# Patient Record
Sex: MALE | Race: WHITE | NOT HISPANIC OR LATINO | URBAN - METROPOLITAN AREA
[De-identification: names, ages, dates, MRNs, and addresses within clinical notes are randomized per-mention and may not be internally consistent; named-entity substitution may affect disease eponyms.]

---

## 2019-10-24 ENCOUNTER — OUTPATIENT (OUTPATIENT)
Dept: OUTPATIENT SERVICES | Facility: HOSPITAL | Age: 69
LOS: 1 days | End: 2019-10-24
Payer: MEDICARE

## 2019-10-24 LAB — GLUCOSE BLDC GLUCOMTR-MCNC: 94 MG/DL — SIGNIFICANT CHANGE UP (ref 70–99)

## 2019-10-24 PROCEDURE — 78072 PARATHYRD PLANAR W/SPECT&CT: CPT | Mod: 26

## 2019-10-24 PROCEDURE — 82962 GLUCOSE BLOOD TEST: CPT

## 2019-10-24 PROCEDURE — A9500: CPT

## 2019-10-24 PROCEDURE — 78072 PARATHYRD PLANAR W/SPECT&CT: CPT

## 2019-10-24 PROCEDURE — A9512: CPT

## 2019-11-18 ENCOUNTER — APPOINTMENT (OUTPATIENT)
Dept: OTOLARYNGOLOGY | Facility: CLINIC | Age: 69
End: 2019-11-18
Payer: MEDICARE

## 2019-11-18 PROCEDURE — 99204 OFFICE O/P NEW MOD 45 MIN: CPT | Mod: 25

## 2019-11-18 PROCEDURE — 31575 DIAGNOSTIC LARYNGOSCOPY: CPT

## 2019-12-19 ENCOUNTER — APPOINTMENT (OUTPATIENT)
Dept: OTOLARYNGOLOGY | Facility: HOSPITAL | Age: 69
End: 2019-12-19

## 2019-12-19 VITALS
HEIGHT: 70 IN | TEMPERATURE: 98 F | OXYGEN SATURATION: 98 % | DIASTOLIC BLOOD PRESSURE: 76 MMHG | HEART RATE: 51 BPM | SYSTOLIC BLOOD PRESSURE: 119 MMHG | WEIGHT: 174.39 LBS | RESPIRATION RATE: 16 BRPM

## 2019-12-20 ENCOUNTER — RESULT REVIEW (OUTPATIENT)
Age: 69
End: 2019-12-20

## 2019-12-20 ENCOUNTER — INPATIENT (INPATIENT)
Facility: HOSPITAL | Age: 69
LOS: 0 days | Discharge: HOME | DRG: 627 | End: 2019-12-21
Attending: SPECIALIST | Admitting: SPECIALIST
Payer: COMMERCIAL

## 2019-12-20 ENCOUNTER — APPOINTMENT (OUTPATIENT)
Dept: OTOLARYNGOLOGY | Facility: HOSPITAL | Age: 69
End: 2019-12-20

## 2019-12-20 DIAGNOSIS — Z90.89 ACQUIRED ABSENCE OF OTHER ORGANS: Chronic | ICD-10-CM

## 2019-12-20 DIAGNOSIS — Z90.49 ACQUIRED ABSENCE OF OTHER SPECIFIED PARTS OF DIGESTIVE TRACT: Chronic | ICD-10-CM

## 2019-12-20 LAB
CALCIUM SERPL-MCNC: 9.8 MG/DL — SIGNIFICANT CHANGE UP (ref 8.4–10.5)
PTH-INTACT IO % DIF SERPL: 154.1 PG/ML — HIGH (ref 8.5–72.5)
PTH-INTACT IO % DIF SERPL: 40.6 PG/ML — SIGNIFICANT CHANGE UP (ref 8.5–72.5)
PTH-INTACT IO % DIF SERPL: 65.7 PG/ML — SIGNIFICANT CHANGE UP (ref 8.5–72.5)

## 2019-12-20 PROCEDURE — 88305 TISSUE EXAM BY PATHOLOGIST: CPT | Mod: 26

## 2019-12-20 PROCEDURE — 60500 EXPLORE PARATHYROID GLANDS: CPT

## 2019-12-20 PROCEDURE — 88331 PATH CONSLTJ SURG 1 BLK 1SPC: CPT | Mod: 26

## 2019-12-20 RX ORDER — ATENOLOL 25 MG/1
1 TABLET ORAL
Qty: 0 | Refills: 0 | DISCHARGE

## 2019-12-20 RX ORDER — ATORVASTATIN CALCIUM 80 MG/1
20 TABLET, FILM COATED ORAL AT BEDTIME
Refills: 0 | Status: DISCONTINUED | OUTPATIENT
Start: 2019-12-20 | End: 2019-12-21

## 2019-12-20 RX ORDER — MORPHINE SULFATE 50 MG/1
2 CAPSULE, EXTENDED RELEASE ORAL ONCE
Refills: 0 | Status: DISCONTINUED | OUTPATIENT
Start: 2019-12-20 | End: 2019-12-21

## 2019-12-20 RX ORDER — OXYCODONE AND ACETAMINOPHEN 5; 325 MG/1; MG/1
2 TABLET ORAL EVERY 4 HOURS
Refills: 0 | Status: DISCONTINUED | OUTPATIENT
Start: 2019-12-20 | End: 2019-12-21

## 2019-12-20 RX ORDER — ACETAMINOPHEN 500 MG
650 TABLET ORAL EVERY 6 HOURS
Refills: 0 | Status: DISCONTINUED | OUTPATIENT
Start: 2019-12-20 | End: 2019-12-21

## 2019-12-20 RX ORDER — ATENOLOL 25 MG/1
25 TABLET ORAL DAILY
Refills: 0 | Status: DISCONTINUED | OUTPATIENT
Start: 2019-12-20 | End: 2019-12-21

## 2019-12-20 RX ORDER — SODIUM CHLORIDE 9 MG/ML
500 INJECTION, SOLUTION INTRAVENOUS
Refills: 0 | Status: DISCONTINUED | OUTPATIENT
Start: 2019-12-20 | End: 2019-12-21

## 2019-12-20 RX ORDER — OMEPRAZOLE 10 MG/1
1 CAPSULE, DELAYED RELEASE ORAL
Qty: 0 | Refills: 0 | DISCHARGE

## 2019-12-20 RX ORDER — ATORVASTATIN CALCIUM 80 MG/1
1 TABLET, FILM COATED ORAL
Qty: 0 | Refills: 0 | DISCHARGE

## 2019-12-20 RX ORDER — BUPROPION HYDROCHLORIDE 150 MG/1
1 TABLET, EXTENDED RELEASE ORAL
Qty: 0 | Refills: 0 | DISCHARGE

## 2019-12-20 RX ORDER — ONDANSETRON 8 MG/1
4 TABLET, FILM COATED ORAL ONCE
Refills: 0 | Status: DISCONTINUED | OUTPATIENT
Start: 2019-12-20 | End: 2019-12-21

## 2019-12-20 RX ORDER — ALENDRONATE SODIUM 70 MG/1
1 TABLET ORAL
Qty: 0 | Refills: 0 | DISCHARGE

## 2019-12-20 RX ORDER — BUPROPION HYDROCHLORIDE 150 MG/1
150 TABLET, EXTENDED RELEASE ORAL DAILY
Refills: 0 | Status: DISCONTINUED | OUTPATIENT
Start: 2019-12-20 | End: 2019-12-21

## 2019-12-20 RX ORDER — OXYCODONE AND ACETAMINOPHEN 5; 325 MG/1; MG/1
1 TABLET ORAL EVERY 4 HOURS
Refills: 0 | Status: DISCONTINUED | OUTPATIENT
Start: 2019-12-20 | End: 2019-12-21

## 2019-12-20 RX ORDER — PANTOPRAZOLE SODIUM 20 MG/1
40 TABLET, DELAYED RELEASE ORAL
Refills: 0 | Status: DISCONTINUED | OUTPATIENT
Start: 2019-12-20 | End: 2019-12-21

## 2019-12-20 RX ORDER — CEFAZOLIN SODIUM 1 G
1000 VIAL (EA) INJECTION EVERY 8 HOURS
Refills: 0 | Status: COMPLETED | OUTPATIENT
Start: 2019-12-20 | End: 2019-12-21

## 2019-12-20 RX ORDER — LORATADINE 10 MG/1
10 TABLET ORAL DAILY
Refills: 0 | Status: DISCONTINUED | OUTPATIENT
Start: 2019-12-20 | End: 2019-12-21

## 2019-12-20 RX ADMIN — Medication 100 MILLIGRAM(S): at 15:39

## 2019-12-20 RX ADMIN — Medication 100 MILLIGRAM(S): at 22:26

## 2019-12-20 RX ADMIN — ATORVASTATIN CALCIUM 20 MILLIGRAM(S): 80 TABLET, FILM COATED ORAL at 22:26

## 2019-12-20 NOTE — H&P ADULT - HISTORY OF PRESENT ILLNESS
ENT H&P     HPI: 70yo Male w hx of hyperparathyroidism s/p L parathyroidectomy. Intraop PTH with >50% drop. Patient extubated in OR, transferred to PACU in stable condition. Patient tolerating diet, pain well controlled. Denies numbness or tingling of fingers and mouth.      Allergies    sulfa drugs (Unknown)    Intolerances        PAST MEDICAL & SURGICAL HISTORY:  HLD (hyperlipidemia)  GERD (gastroesophageal reflux disease)  BPH (benign prostatic hyperplasia)  HTN (hypertension)  Hyperparathyroidism  History of tonsillectomy  History of cholecystectomy      REVIEW OF SYSTEMS as above     Home Medications:  alendronate 70 mg oral tablet: 1 tab(s) orally once a week (20 Dec 2019 06:27)  atenolol 25 mg oral tablet: 1 tab(s) orally once a day (20 Dec 2019 06:27)  buPROPion 150 mg/12 hours (SR) oral tablet, extended release: 1 tab(s) orally once a day (at bedtime) (20 Dec 2019 06:27)  Lipitor 20 mg oral tablet: 1 tab(s) orally once a day (20 Dec 2019 06:27)  PriLOSEC 20 mg oral delayed release capsule: 1 cap(s) orally once a day (20 Dec 2019 06:27)      Vital Signs Last 24 Hrs  T(C): 36.8 (19 Dec 2019 12:49), Max: 36.8 (19 Dec 2019 12:49)  T(F): 98.3 (19 Dec 2019 12:49), Max: 98.3 (19 Dec 2019 12:49)  HR: 51 (19 Dec 2019 12:49) (51 - 51)  BP: 119/76 (19 Dec 2019 12:49) (119/76 - 119/76)  BP(mean): --  RR: 16 (19 Dec 2019 12:49) (16 - 16)  SpO2: 98% (19 Dec 2019 12:49) (98% - 98%)    LABS:    Endocrine Panel-  --  40.6 pg/mL  --  --  65.7 pg/mL  --  --  154.1 pg/mL  --      PHYSICAL EXAM:  NAD, awake and alert  No hoarseness.     No resp distress on room air   Midline neck incision c/d/i, soft  L neck BRANDON w SS output   SILVESTRE, SAEIDP     Assessment/Plan:  69y Male w hx of hyperparathyroidism s/p L parathyroidectomy on 12/20  - Admit to regional room  - Ancef   - BRANDON drain care  - Pain control  - Nausea PRN   - Continue home meds   - Regular diet   - OOB/ambulate     Page E[facility] at [phone] with any questions/concerns.

## 2019-12-21 ENCOUNTER — TRANSCRIPTION ENCOUNTER (OUTPATIENT)
Age: 69
End: 2019-12-21

## 2019-12-21 VITALS
SYSTOLIC BLOOD PRESSURE: 137 MMHG | HEART RATE: 61 BPM | OXYGEN SATURATION: 97 % | DIASTOLIC BLOOD PRESSURE: 81 MMHG | TEMPERATURE: 98 F | RESPIRATION RATE: 17 BRPM

## 2019-12-21 LAB
CALCIUM SERPL-MCNC: 9.1 MG/DL — SIGNIFICANT CHANGE UP (ref 8.4–10.5)
CALCIUM SERPL-MCNC: 9.4 MG/DL — SIGNIFICANT CHANGE UP (ref 8.4–10.5)
HCV AB S/CO SERPL IA: 0.33 S/CO — SIGNIFICANT CHANGE UP
HCV AB SERPL-IMP: SIGNIFICANT CHANGE UP

## 2019-12-21 RX ADMIN — Medication 100 MILLIGRAM(S): at 05:52

## 2019-12-21 RX ADMIN — SODIUM CHLORIDE 75 MILLILITER(S): 9 INJECTION, SOLUTION INTRAVENOUS at 05:51

## 2019-12-21 RX ADMIN — PANTOPRAZOLE SODIUM 40 MILLIGRAM(S): 20 TABLET, DELAYED RELEASE ORAL at 02:09

## 2019-12-21 RX ADMIN — ATENOLOL 25 MILLIGRAM(S): 25 TABLET ORAL at 05:52

## 2019-12-21 NOTE — DISCHARGE NOTE PROVIDER - CARE PROVIDER_API CALL
Cheryl Aquino)  [facility] Surgery; Otolaryngology  [address] [place] [address], [address] [place] [address]  Phone: [phone]  Fax: [phone]  Scheduled Appointment: 12/30/2019

## 2019-12-21 NOTE — DISCHARGE NOTE PROVIDER - NSDCMRMEDTOKEN_GEN_ALL_CORE_FT
alendronate 70 mg oral tablet: 1 tab(s) orally once a week  atenolol 25 mg oral tablet: 1 tab(s) orally once a day  buPROPion 150 mg/12 hours (SR) oral tablet, extended release: 1 tab(s) orally once a day (at bedtime)  Lipitor 20 mg oral tablet: 1 tab(s) orally once a day  Percocet 5/325 oral tablet: 1 tab(s) orally every 4 hours MDD:6 tabs  PriLOSEC 20 mg oral delayed release capsule: 1 cap(s) orally once a day

## 2019-12-21 NOTE — DISCHARGE NOTE NURSING/CASE MANAGEMENT/SOCIAL WORK - PATIENT PORTAL LINK FT
You can access the FollowMyHealth Patient Portal offered by [facility] by registering at the following website: [email or web address]. By joining [employer]’s FollowMyHealth portal, you will also be able to view your health information using other applications (apps) compatible with our system.

## 2019-12-21 NOTE — DISCHARGE NOTE PROVIDER - NSDCCPCAREPLAN_GEN_ALL_CORE_FT
PRINCIPAL DISCHARGE DIAGNOSIS  Diagnosis: S/P parathyroidectomy  Assessment and Plan of Treatment: 1.Report any fever, chills, difficulty breathing, difficulty swallowing, and change in your voice, bleeding or purulent drainage from your incision sites, or any significant swelling to your neck to the doctor immediately.  2.Report any numbness/tingling to toes or lips to your doctor immediately. If you have numbness or tingling in your fingers/lips take 2 tums or 2 tablets of calcium supplementation and call your doctor immediately.   3.Diet: soft/mechanical soft diet, no sharp foods, no extensive chewing of steaks or hard meats etc. You can resume a normal diet once your throat feels back to normal.  4.Activity: no heavy lifting, do not lift anything heavier than a gallon of milk until after you follow up appointment with your doctor, no strenuous activity such as running or biking.  5.Shower/Bathing: you shower starting 48 hrs after you procedure, after 48 hrs you may wash your hair and shower but do not scrub at your neck incision  6.Wound care: keep your incision site clean and dry   7.Take all medications as prescribed.   8.Continue all of your normal home medications unless told otherwise.  9.Avoid any NSAIDS or ibuprofen-containing products; you may take Tylenol for mild pain.

## 2019-12-21 NOTE — DISCHARGE NOTE PROVIDER - HOSPITAL COURSE
70yo Male w hx of hyperparathyroidism s/p L parathyroidectomy. Intraop PTH with >50% drop. Patient extubated in OR, transferred to PACU in stable condition. Patient tolerating diet, pain well controlled. Denies numbness or tingling of fingers and mouth.  Overnight calcium checks were 9.8 and 9.4. BRANDON output monitored with appropriate decrease for removal in AM. By the time of discharge on POD1, the patient was tolerating diet, voiding, and ambulating and was deemed stable for discharge.        PHYSICAL EXAM:    NAD, awake and alert  No hoarseness.       No resp distress on room air     Midline neck incision c/d/i, soft    SILVESTRE, WWP

## 2019-12-23 PROBLEM — K21.9 GASTRO-ESOPHAGEAL REFLUX DISEASE WITHOUT ESOPHAGITIS: Chronic | Status: ACTIVE | Noted: 2019-12-19

## 2019-12-23 PROBLEM — E21.3 HYPERPARATHYROIDISM, UNSPECIFIED: Chronic | Status: ACTIVE | Noted: 2019-12-19

## 2019-12-23 PROBLEM — E78.5 HYPERLIPIDEMIA, UNSPECIFIED: Chronic | Status: ACTIVE | Noted: 2019-12-19

## 2019-12-23 PROBLEM — N40.0 BENIGN PROSTATIC HYPERPLASIA WITHOUT LOWER URINARY TRACT SYMPTOMS: Chronic | Status: ACTIVE | Noted: 2019-12-19

## 2019-12-23 PROBLEM — I10 ESSENTIAL (PRIMARY) HYPERTENSION: Chronic | Status: ACTIVE | Noted: 2019-12-19

## 2019-12-24 LAB — SURGICAL PATHOLOGY STUDY: SIGNIFICANT CHANGE UP

## 2019-12-30 ENCOUNTER — LABORATORY RESULT (OUTPATIENT)
Age: 69
End: 2019-12-30

## 2019-12-30 ENCOUNTER — APPOINTMENT (OUTPATIENT)
Dept: OTOLARYNGOLOGY | Facility: CLINIC | Age: 69
End: 2019-12-30
Payer: MEDICARE

## 2019-12-30 PROCEDURE — 31575 DIAGNOSTIC LARYNGOSCOPY: CPT | Mod: 58

## 2019-12-31 DIAGNOSIS — F32.9 MAJOR DEPRESSIVE DISORDER, SINGLE EPISODE, UNSPECIFIED: ICD-10-CM

## 2019-12-31 DIAGNOSIS — E21.0 PRIMARY HYPERPARATHYROIDISM: ICD-10-CM

## 2019-12-31 DIAGNOSIS — K21.9 GASTRO-ESOPHAGEAL REFLUX DISEASE WITHOUT ESOPHAGITIS: ICD-10-CM

## 2019-12-31 DIAGNOSIS — D35.1 BENIGN NEOPLASM OF PARATHYROID GLAND: ICD-10-CM

## 2019-12-31 DIAGNOSIS — I10 ESSENTIAL (PRIMARY) HYPERTENSION: ICD-10-CM

## 2019-12-31 DIAGNOSIS — M81.0 AGE-RELATED OSTEOPOROSIS WITHOUT CURRENT PATHOLOGICAL FRACTURE: ICD-10-CM

## 2019-12-31 PROCEDURE — 86803 HEPATITIS C AB TEST: CPT

## 2019-12-31 PROCEDURE — 88305 TISSUE EXAM BY PATHOLOGIST: CPT

## 2019-12-31 PROCEDURE — 60500 EXPLORE PARATHYROID GLANDS: CPT

## 2019-12-31 PROCEDURE — 82310 ASSAY OF CALCIUM: CPT

## 2019-12-31 PROCEDURE — 88331 PATH CONSLTJ SURG 1 BLK 1SPC: CPT

## 2019-12-31 PROCEDURE — 36415 COLL VENOUS BLD VENIPUNCTURE: CPT

## 2019-12-31 PROCEDURE — 83970 ASSAY OF PARATHORMONE: CPT

## 2020-05-18 ENCOUNTER — APPOINTMENT (OUTPATIENT)
Dept: OTOLARYNGOLOGY | Facility: CLINIC | Age: 70
End: 2020-05-18
Payer: MEDICARE

## 2020-05-18 PROCEDURE — 99213 OFFICE O/P EST LOW 20 MIN: CPT | Mod: 95

## 2020-06-03 ENCOUNTER — TRANSCRIPTION ENCOUNTER (OUTPATIENT)
Age: 70
End: 2020-06-03

## 2020-07-07 ENCOUNTER — APPOINTMENT (OUTPATIENT)
Dept: UROLOGY | Facility: CLINIC | Age: 70
End: 2020-07-07
Payer: MEDICARE

## 2020-07-07 VITALS — DIASTOLIC BLOOD PRESSURE: 73 MMHG | TEMPERATURE: 97.6 F | HEART RATE: 52 BPM | SYSTOLIC BLOOD PRESSURE: 110 MMHG

## 2020-07-07 LAB
BILIRUB UR QL STRIP: NORMAL
CLARITY UR: CLEAR
COLLECTION METHOD: NORMAL
GLUCOSE UR-MCNC: NORMAL
HCG UR QL: 0.2 EU/DL
HGB UR QL STRIP.AUTO: NORMAL
KETONES UR-MCNC: NORMAL
LEUKOCYTE ESTERASE UR QL STRIP: NORMAL
NITRITE UR QL STRIP: NORMAL
PH UR STRIP: 6.5
PROT UR STRIP-MCNC: NORMAL
SP GR UR STRIP: 1.01

## 2020-07-07 PROCEDURE — 84153 ASSAY OF PSA TOTAL: CPT

## 2020-07-07 PROCEDURE — 99215 OFFICE O/P EST HI 40 MIN: CPT | Mod: 25

## 2020-07-07 PROCEDURE — 76857 US EXAM PELVIC LIMITED: CPT

## 2020-07-07 PROCEDURE — 81003 URINALYSIS AUTO W/O SCOPE: CPT | Mod: QW

## 2020-07-07 NOTE — ASSESSMENT
[FreeTextEntry1] : 70 year old male with family history of prostate cancer and elevated PSA. 4K sent today-will be in touch with patient regarding the results.

## 2020-07-07 NOTE — LETTER BODY
[Dear  ___] : Dear  [unfilled], [Consult Letter:] : I had the pleasure of evaluating your patient, [unfilled]. [Please see my note below.] : Please see my note below. [Sincerely,] : Sincerely, [Consult Closing:] : Thank you very much for allowing me to participate in the care of this patient.  If you have any questions, please do not hesitate to contact me. [FreeTextEntry3] : Dr. Fe Cardenas\par

## 2020-07-07 NOTE — HISTORY OF PRESENT ILLNESS
[FreeTextEntry1] : Pt is a former patient of Dr. Nelson's who has transitioned care to me.   He has a family history of prostate cancer and an elevated PSA.  His most recent PSA drawn at his PCP's office was 7.6.  \par In the past he underwent an MRI guided prostate biopsy at [facility] which was inconclusive, (6/2019)\par \par He has 1-2 episodes of nocturia per night.  He states he "occasionally" will have to void several times an hour but this is not a regular pattern. \par \par PSA history:  \par 6/20 7.6  \par 3/19 8.5   4K 33%  (4/2019)\par 6/18 7.1\par 2/18 8.2\par \par PMH:  hyperparathyroid, HTN, hyperchol\par PSH:  devi, cataract, parathyroidectomy \par FamHx: prostate cancer (brother)\par No tob, occasional alcohol\par \par

## 2020-07-07 NOTE — PHYSICAL EXAM
[General Appearance - Well Developed] : well developed [General Appearance - Well Nourished] : well nourished [Well Groomed] : well groomed [Normal Appearance] : normal appearance [General Appearance - In No Acute Distress] : no acute distress [Abdomen Soft] : soft [Abdomen Tenderness] : non-tender [Costovertebral Angle Tenderness] : no ~M costovertebral angle tenderness [Urethral Meatus] : meatus normal [Scrotum] : the scrotum was normal [Penis Abnormality] : normal circumcised penis [Urinary Bladder Findings] : the bladder was normal on palpation [Testes Mass (___cm)] : there were no testicular masses [No Prostate Nodules] : no prostate nodules [FreeTextEntry1] : left gland firm, slight asymmetry [] : no respiratory distress [Edema] : no peripheral edema [Respiration, Rhythm And Depth] : normal respiratory rhythm and effort [Oriented To Time, Place, And Person] : oriented to person, place, and time [Exaggerated Use Of Accessory Muscles For Inspiration] : no accessory muscle use [Affect] : the affect was normal [Not Anxious] : not anxious [Mood] : the mood was normal [No Palpable Adenopathy] : no palpable adenopathy [No Focal Deficits] : no focal deficits [Normal Station and Gait] : the gait and station were normal for the patient's age

## 2020-07-17 LAB
4K SCORE CALCULATION: 6 %
FREE PSA: 1.83 NG/ML
PERCENT FREE PSA: 18 %
TOTAL PSA: 10.44 NG/ML

## 2020-07-29 LAB — PSA SERPL-MCNC: 8.18 NG/ML

## 2021-04-29 ENCOUNTER — NON-APPOINTMENT (OUTPATIENT)
Age: 71
End: 2021-04-29

## 2021-05-19 ENCOUNTER — APPOINTMENT (OUTPATIENT)
Dept: UROLOGY | Facility: CLINIC | Age: 71
End: 2021-05-19
Payer: MEDICARE

## 2021-05-19 VITALS
HEART RATE: 60 BPM | SYSTOLIC BLOOD PRESSURE: 113 MMHG | BODY MASS INDEX: 24.91 KG/M2 | HEIGHT: 70 IN | OXYGEN SATURATION: 97 % | TEMPERATURE: 98.2 F | WEIGHT: 174 LBS | DIASTOLIC BLOOD PRESSURE: 69 MMHG

## 2021-05-19 PROCEDURE — 99214 OFFICE O/P EST MOD 30 MIN: CPT

## 2021-05-20 LAB
PSA FREE FLD-MCNC: 18 %
PSA FREE SERPL-MCNC: 1.54 NG/ML
PSA SERPL-MCNC: 8.68 NG/ML

## 2021-05-20 NOTE — ASSESSMENT
[FreeTextEntry1] : 70 year old male with family history of prostate cancer and elevated PSA. Will draw PSA today and I will be in touch with the results. Because he has a family history of CaP as well as a hx of elevated PSA with prior inconclusive biopsy results, I will consider sending him to one of my partners for further evaluation/possible fusion biopsy.   \par \par I believe his morning lower urinary tract symptoms correlate to bladder irritant/caffeine intake and I have advised him to decrease amount of coffee.  Will address bladder urgency if it worsen or he becomes greatly bothered. \par \par Patient expressed understanding.

## 2021-05-20 NOTE — LETTER BODY
[Dear  ___] : Dear  [unfilled], [Consult Letter:] : I had the pleasure of evaluating your patient, [unfilled]. [Please see my note below.] : Please see my note below. [Consult Closing:] : Thank you very much for allowing me to participate in the care of this patient.  If you have any questions, please do not hesitate to contact me. [Sincerely,] : Sincerely, [FreeTextEntry3] : Dr. Fe Cardenas\par

## 2021-05-20 NOTE — PHYSICAL EXAM
[General Appearance - Well Developed] : well developed [General Appearance - Well Nourished] : well nourished [Normal Appearance] : normal appearance [Well Groomed] : well groomed [General Appearance - In No Acute Distress] : no acute distress [Urethral Meatus] : meatus normal [Penis Abnormality] : normal circumcised penis [Urinary Bladder Findings] : the bladder was normal on palpation [Scrotum] : the scrotum was normal [Testes Mass (___cm)] : there were no testicular masses [No Prostate Nodules] : no prostate nodules [Edema] : no peripheral edema [] : no respiratory distress [Exaggerated Use Of Accessory Muscles For Inspiration] : no accessory muscle use [Oriented To Time, Place, And Person] : oriented to person, place, and time [Affect] : the affect was normal [Mood] : the mood was normal [Not Anxious] : not anxious [Normal Station and Gait] : the gait and station were normal for the patient's age [No Focal Deficits] : no focal deficits [Prostate Tenderness] : the prostate was not tender [FreeTextEntry1] : nontender, nonnodular prostate

## 2021-05-20 NOTE — HISTORY OF PRESENT ILLNESS
[FreeTextEntry1] : 70 year old male with family history of prostate cancer and elevated PSA presenting for annual wellness exam. He reports incomplete bladder empty sensation in the morning with strong stream. He states this sensation occurs s/p coffee. Continues to report nocturia 1-2x per night. \par \par Full Hx: \par Pt is a former patient of Dr. Nelson's who has transitioned care to me.   He has a family history of prostate cancer and an elevated PSA.  He last presented to me 7/7/2020. His most recent PSA drawn at his PCP's office was 7.6.  In the past he underwent an MRI guided prostate biopsy at [facility] which was inconclusive, (6/2019)\par \par He had 1-2 episodes of nocturia per night.  He stated he "occasionally" will have to void several times an hour but this was not a regular pattern. 4K sent. \par \par Udip: negative\par \par PSA history: \par 7/20 8.18 4K 6%\par 6/20 7.6  \par 3/19 8.5   4K 33%  (4/2019)\par 6/18 7.1\par 2/18 8.2\par \par PMH:  hyperparathyroid, HTN, hyperchol\par PSH:  devi, cataract, parathyroidectomy \par FamHx: prostate cancer (brother)\par No tob, occasional alcohol\par \par

## 2021-06-25 ENCOUNTER — APPOINTMENT (OUTPATIENT)
Dept: UROLOGY | Facility: CLINIC | Age: 71
End: 2021-06-25
Payer: MEDICARE

## 2021-06-25 DIAGNOSIS — Z86.39 PERSONAL HISTORY OF OTHER ENDOCRINE, NUTRITIONAL AND METABOLIC DISEASE: ICD-10-CM

## 2021-06-25 DIAGNOSIS — I10 ESSENTIAL (PRIMARY) HYPERTENSION: ICD-10-CM

## 2021-06-25 DIAGNOSIS — Z80.42 FAMILY HISTORY OF MALIGNANT NEOPLASM OF PROSTATE: ICD-10-CM

## 2021-06-25 DIAGNOSIS — Z87.2 PERSONAL HISTORY OF DISEASES OF THE SKIN AND SUBCUTANEOUS TISSUE: ICD-10-CM

## 2021-06-25 PROCEDURE — 99214 OFFICE O/P EST MOD 30 MIN: CPT

## 2021-06-25 NOTE — PHYSICAL EXAM
[General Appearance - Well Developed] : well developed [Normal Appearance] : normal appearance [Abdomen Soft] : soft [Urethral Meatus] : meatus normal [Penis Abnormality] : normal circumcised penis [Epididymis] : the epididymides were normal [Testes Tenderness] : no tenderness of the testes [Testes Mass (___cm)] : there were no testicular masses [Prostate Tenderness] : the prostate was not tender [No Prostate Nodules] : no prostate nodules [Prostate Size ___ (0-4)] : prostate size [unfilled] (scale: 0-4) [FreeTextEntry1] : Right lobe of prostate more prominent than the left.  [Skin Color & Pigmentation] : normal skin color and pigmentation [Heart Rate And Rhythm] : Heart rate and rhythm were normal [] : no respiratory distress [Oriented To Time, Place, And Person] : oriented to person, place, and time [Normal Station and Gait] : the gait and station were normal for the patient's age [No Focal Deficits] : no focal deficits

## 2021-06-25 NOTE — HISTORY OF PRESENT ILLNESS
[FreeTextEntry1] : 72 yo male referred for hx of elevated PSA.  HIs most recent PSA was from May 2021 and was 8.6.  In July 2020 the PSA was 8.18.  A 4K score from July 202 was 6% and the PSA was 10.4.  Prostate MR in 2019 showed a PIRADs 4 lesion in the left posterior PZ.  He underwent two separate in-gantry MR biopsies. The prostate was measured at 85 cc in volume.  Both biopsies did not sample prostatic tissue. \par \par On a separate note, he complains of LUTS with urinary frequency, urgency, a weak stream, and nocturia x 1.  He denies dysuria, hematuria, incontinence, or hesitancy.  (IPSS from July 2020 was 5, and QoL was 2)

## 2021-06-25 NOTE — ASSESSMENT
[FreeTextEntry1] : 70 yo male with hx of elevated PSA, low risk 4k Score and two prior in-gantry MR prostate biopsies that did no sample prstate tissue. \par \par The patient's records were reviewed and discussed. The differential diagnosis of an elevated PSA (prostate specific antigen) level was discussed including prostate enlargement, prostate inflammation or infection, and prostate cancer. Options were provided for further evaluation including, but not limited to, serial PSA and digital rectal exam, PCA3, prostate MRI, and prostate biopsy. Newer tests including PHI (prostate health index) and 4Kscore tests were discussed. The merits and limitations as well as adverse effects associated with each option was provided. \par \par He has not had an MR since 2019.  I suggested we repeat the MR.  If a PIRADs 4 lesion is again noted then we will proceed with MR-US fusion transperineal biopsy.  \par \par We also discussed his LUTS secondary to BPH.\par \par Lower urinary symptoms were reviewed including the potential etiologies of the patient's symptoms. The anatomy of the urinary tract was reviewed. Bladder, prostate, and urethral sources, as well as non-urologic sources, were discussed. Options for evaluation were discussed including cystoscopy, urodynamics, and pelvis ultrasonography. Management of symptoms were reviewed including no therapy, medical therapy, and surgical therapy. The long term sequelae of no treatment, including no adverse effects, potential for progressive lower urinary tract symptoms or deterioration, urinary retention, bladder dysfunction, and renal dysfunction were reviewed. \par Medical therapy for BPH (benign prostatic hyperplasia), or prostate enlargement, was reviewed including the physiology of medication therapy. Alpha blocker medication therapy was reviewed including adverse effects (including dizziness, hypotension, retrograde ejaculation, rhinitis, fatigue), proper usage, and contraindications. \par \par He would like to try tamsulosin 0.4 mg nightly.  \par

## 2021-06-28 ENCOUNTER — NON-APPOINTMENT (OUTPATIENT)
Age: 71
End: 2021-06-28

## 2021-06-28 LAB
APPEARANCE: CLEAR
BACTERIA UR CULT: NORMAL
BACTERIA: NEGATIVE
BILIRUBIN URINE: NEGATIVE
BLOOD URINE: NEGATIVE
COLOR: NORMAL
GLUCOSE QUALITATIVE U: NEGATIVE
HYALINE CASTS: 0 /LPF
KETONES URINE: NEGATIVE
LEUKOCYTE ESTERASE URINE: NEGATIVE
MICROSCOPIC-UA: NORMAL
NITRITE URINE: NEGATIVE
PH URINE: 6.5
PROTEIN URINE: NEGATIVE
RED BLOOD CELLS URINE: 1 /HPF
SPECIFIC GRAVITY URINE: 1.01
SQUAMOUS EPITHELIAL CELLS: 0 /HPF
UROBILINOGEN URINE: NORMAL
WHITE BLOOD CELLS URINE: 0 /HPF

## 2021-08-13 ENCOUNTER — NON-APPOINTMENT (OUTPATIENT)
Age: 71
End: 2021-08-13

## 2021-08-16 ENCOUNTER — NON-APPOINTMENT (OUTPATIENT)
Age: 71
End: 2021-08-16

## 2021-08-17 ENCOUNTER — TRANSCRIPTION ENCOUNTER (OUTPATIENT)
Age: 71
End: 2021-08-17

## 2021-08-18 ENCOUNTER — APPOINTMENT (OUTPATIENT)
Dept: PULMONOLOGY | Facility: CLINIC | Age: 71
End: 2021-08-18

## 2021-09-10 ENCOUNTER — TRANSCRIPTION ENCOUNTER (OUTPATIENT)
Age: 71
End: 2021-09-10

## 2021-12-14 ENCOUNTER — APPOINTMENT (OUTPATIENT)
Dept: UROLOGY | Facility: CLINIC | Age: 71
End: 2021-12-14
Payer: MEDICARE

## 2021-12-14 VITALS
DIASTOLIC BLOOD PRESSURE: 87 MMHG | SYSTOLIC BLOOD PRESSURE: 155 MMHG | TEMPERATURE: 98.1 F | OXYGEN SATURATION: 96 % | HEART RATE: 54 BPM

## 2021-12-14 LAB
BILIRUB UR QL STRIP: NORMAL
CLARITY UR: CLEAR
COLLECTION METHOD: NORMAL
GLUCOSE UR-MCNC: NORMAL
HCG UR QL: 0.2 EU/DL
HGB UR QL STRIP.AUTO: NORMAL
KETONES UR-MCNC: NORMAL
LEUKOCYTE ESTERASE UR QL STRIP: NORMAL
NITRITE UR QL STRIP: NORMAL
PH UR STRIP: 7
PROT UR STRIP-MCNC: NORMAL
SP GR UR STRIP: 1.01

## 2021-12-14 PROCEDURE — 81003 URINALYSIS AUTO W/O SCOPE: CPT | Mod: QW

## 2021-12-14 PROCEDURE — 51798 US URINE CAPACITY MEASURE: CPT

## 2021-12-14 PROCEDURE — 99214 OFFICE O/P EST MOD 30 MIN: CPT

## 2021-12-15 ENCOUNTER — NON-APPOINTMENT (OUTPATIENT)
Age: 71
End: 2021-12-15

## 2021-12-15 LAB
PSA FREE FLD-MCNC: 15 %
PSA FREE SERPL-MCNC: 1.19 NG/ML
PSA SERPL-MCNC: 7.71 NG/ML

## 2021-12-15 NOTE — LETTER BODY
[Dear  ___] : Dear  [unfilled], [Consult Letter:] : I had the pleasure of evaluating your patient, [unfilled]. [Please see my note below.] : Please see my note below. [Consult Closing:] : Thank you very much for allowing me to participate in the care of this patient.  If you have any questions, please do not hesitate to contact me. [Sincerely,] : Sincerely, [FreeTextEntry3] : Dr. eF Cardenas\par

## 2021-12-15 NOTE — ASSESSMENT
[FreeTextEntry1] : 71 year old male with family history of prostate cancer and elevated PSA. Will draw PSA today and I will be in touch with the results. His most recent repeat MRI done on 6/25/21 showed no PIRAD 4 lesion.   He does have a PIRADS 2 lesion.  Pt will continue taking tamsulosin 0.4 mg.  Provided his PSA is stable, I look forward to seeing him in 1 year. \par \par Patient expressed understanding.

## 2021-12-15 NOTE — PHYSICAL EXAM
[General Appearance - Well Developed] : well developed [General Appearance - Well Nourished] : well nourished [Normal Appearance] : normal appearance [Well Groomed] : well groomed [General Appearance - In No Acute Distress] : no acute distress [Urethral Meatus] : meatus normal [Penis Abnormality] : normal circumcised penis [Urinary Bladder Findings] : the bladder was normal on palpation [Scrotum] : the scrotum was normal [Testes Mass (___cm)] : there were no testicular masses [Prostate Tenderness] : the prostate was not tender [No Prostate Nodules] : no prostate nodules [Edema] : no peripheral edema [] : no respiratory distress [Exaggerated Use Of Accessory Muscles For Inspiration] : no accessory muscle use [Oriented To Time, Place, And Person] : oriented to person, place, and time [Affect] : the affect was normal [Mood] : the mood was normal [Not Anxious] : not anxious [Normal Station and Gait] : the gait and station were normal for the patient's age [No Focal Deficits] : no focal deficits [FreeTextEntry1] : nontender, nonnodular, enlarged prostate

## 2021-12-15 NOTE — HISTORY OF PRESENT ILLNESS
[FreeTextEntry1] : 71 year old male with family history of prostate cancer and elevated PSA presenting for annual wellness exam.He was last seen by Dr. Quintana 6/25/21 and underwent a repeat MRI which was unremarkable for high grade lesion, (previous PIRADS 4 lesion not seen).  He was started on tamsulosin 0.4 mg for mild obstructive symptoms.  He is feeling well with no urinary complaints.  \par \par He has chronic back pain. \par \par MRI 6/25/21-- no PIRAD 4 lesion seen PIRAD 2 lesion low risk\par PVR: 84 cc (to rule out incomplete bladder emptying) \par \par Full Hx: \par Pt is a former patient of Dr. Nelson's who has transitioned care to me.   He has a family history of prostate cancer and an elevated PSA.  He last presented to me 7/7/2020. His most recent PSA drawn at his PCP's office was 7.6.  In the past he underwent an MRI guided prostate biopsy at [facility] which was inconclusive, (6/2019)\par \par He had 1-2 episodes of nocturia per night.  He stated he "occasionally" will have to void several times an hour but this was not a regular pattern. 4K sent. \par \par Udip: negative\par \par PSA history: \par 7/20 8.18 4K 6%\par 6/20 7.6  \par 3/19 8.5   4K 33%  (4/2019)\par 6/18 7.1\par 2/18 8.2\par 5/19 8.68\par \par PMH:  hyperparathyroid, HTN, hyperchol\par PSH:  devi, cataract, parathyroidectomy \par FamHx: prostate cancer (brother)\par No tob, occasional alcohol\par \par

## 2021-12-15 NOTE — ADDENDUM
[FreeTextEntry1] : A portion of this note was written by [Sabrina Suggs] on 12/14/2021 acting as a scribe for Dr. Cardenas. \par \par I have personally reviewed the chart and agree that the record accurately reflects my personal performance of the history, physical exam, assessment, and plan.

## 2022-01-02 ENCOUNTER — TRANSCRIPTION ENCOUNTER (OUTPATIENT)
Age: 72
End: 2022-01-02

## 2022-04-22 ENCOUNTER — TRANSCRIPTION ENCOUNTER (OUTPATIENT)
Age: 72
End: 2022-04-22

## 2022-06-01 ENCOUNTER — RX RENEWAL (OUTPATIENT)
Age: 72
End: 2022-06-01

## 2022-06-10 ENCOUNTER — NON-APPOINTMENT (OUTPATIENT)
Age: 72
End: 2022-06-10

## 2022-06-10 ENCOUNTER — APPOINTMENT (OUTPATIENT)
Dept: FAMILY MEDICINE | Facility: CLINIC | Age: 72
End: 2022-06-10
Payer: MEDICARE

## 2022-06-10 VITALS
OXYGEN SATURATION: 97 % | TEMPERATURE: 96.6 F | HEART RATE: 55 BPM | HEIGHT: 70 IN | BODY MASS INDEX: 26.34 KG/M2 | WEIGHT: 184 LBS | SYSTOLIC BLOOD PRESSURE: 136 MMHG | DIASTOLIC BLOOD PRESSURE: 87 MMHG

## 2022-06-10 VITALS — SYSTOLIC BLOOD PRESSURE: 122 MMHG | DIASTOLIC BLOOD PRESSURE: 78 MMHG

## 2022-06-10 DIAGNOSIS — Z78.9 OTHER SPECIFIED HEALTH STATUS: ICD-10-CM

## 2022-06-10 DIAGNOSIS — R49.0 DYSPHONIA: ICD-10-CM

## 2022-06-10 DIAGNOSIS — Z80.8 FAMILY HISTORY OF MALIGNANT NEOPLASM OF OTHER ORGANS OR SYSTEMS: ICD-10-CM

## 2022-06-10 DIAGNOSIS — Z87.19 PERSONAL HISTORY OF OTHER DISEASES OF THE DIGESTIVE SYSTEM: ICD-10-CM

## 2022-06-10 DIAGNOSIS — R39.9 UNSPECIFIED SYMPTOMS AND SIGNS INVOLVING THE GENITOURINARY SYSTEM: ICD-10-CM

## 2022-06-10 DIAGNOSIS — Z86.03 PERSONAL HISTORY OF NEOPLASM OF UNCERTAIN BEHAVIOR: ICD-10-CM

## 2022-06-10 PROCEDURE — G0438: CPT

## 2022-06-10 PROCEDURE — 99387 INIT PM E/M NEW PAT 65+ YRS: CPT | Mod: GY,25

## 2022-06-10 PROCEDURE — G0442 ANNUAL ALCOHOL SCREEN 15 MIN: CPT | Mod: 59

## 2022-06-10 PROCEDURE — 36415 COLL VENOUS BLD VENIPUNCTURE: CPT

## 2022-06-10 PROCEDURE — 93000 ELECTROCARDIOGRAM COMPLETE: CPT | Mod: 59

## 2022-06-10 RX ORDER — TAMSULOSIN HYDROCHLORIDE 0.4 MG/1
0.4 CAPSULE ORAL
Qty: 90 | Refills: 3 | Status: DISCONTINUED | COMMUNITY
Start: 2021-08-23 | End: 2022-06-10

## 2022-06-10 RX ORDER — DOXYCLYCLINE HYCLATE 150 MG/1
TABLET, COATED ORAL
Refills: 0 | Status: ACTIVE | COMMUNITY

## 2022-06-10 NOTE — HEALTH RISK ASSESSMENT
[Good] : ~his/her~  mood as  good [Never] : Never [Yes] : Yes [2 - 4 times a month (2 pts)] : 2-4 times a month (2 points) [1 or 2 (0 pts)] : 1 or 2 (0 points) [Never (0 pts)] : Never (0 points) [No] : In the past 12 months have you used drugs other than those required for medical reasons? No [0] : 2) Feeling down, depressed, or hopeless: Not at all (0) [PHQ-2 Negative - No further assessment needed] : PHQ-2 Negative - No further assessment needed [Audit-CScore] : 2 [de-identified] : walking  [de-identified] : fruits, veggies  [EGG2Rjcsv] : 0 [Patient reported colonoscopy was normal] : Patient reported colonoscopy was normal [HIV test declined] : HIV test declined [Hepatitis C test declined] : Hepatitis C test declined [Change in mental status noted] : No change in mental status noted [Language] : denies difficulty with language [None] : None [With Significant Other] : lives with significant other [Retired] : retired [] :  [Sexually Active] : sexually active [Feels Safe at Home] : Feels safe at home [Fully functional (bathing, dressing, toileting, transferring, walking, feeding)] : Fully functional (bathing, dressing, toileting, transferring, walking, feeding) [Fully functional (using the telephone, shopping, preparing meals, housekeeping, doing laundry, using] : Fully functional and needs no help or supervision to perform IADLs (using the telephone, shopping, preparing meals, housekeeping, doing laundry, using transportation, managing medications and managing finances) [ColonoscopyDate] : 06/16 [ColonoscopyComments] : follow up pending

## 2022-06-10 NOTE — HISTORY OF PRESENT ILLNESS
[FreeTextEntry1] : establish care  [de-identified] : 71 yo m presents to establish care. \par Last physical was last year. \par No complaints today. \par Vaccinated for covid.

## 2022-06-10 NOTE — PLAN
[FreeTextEntry1] : follow up labs, labs drawn in office \par follow up ekg \par \par follow up pending with GI for colonoscopy\par follow up pending with urology for PSA

## 2022-06-15 ENCOUNTER — NON-APPOINTMENT (OUTPATIENT)
Age: 72
End: 2022-06-15

## 2022-06-15 LAB
25(OH)D3 SERPL-MCNC: 33.4 NG/ML
ALBUMIN SERPL ELPH-MCNC: 4.5 G/DL
ALP BLD-CCNC: 92 U/L
ALT SERPL-CCNC: 22 U/L
ANION GAP SERPL CALC-SCNC: 14 MMOL/L
AST SERPL-CCNC: 20 U/L
BASOPHILS # BLD AUTO: 0.07 K/UL
BASOPHILS NFR BLD AUTO: 1.1 %
BILIRUB SERPL-MCNC: 0.8 MG/DL
BUN SERPL-MCNC: 18 MG/DL
CALCIUM SERPL-MCNC: 9.5 MG/DL
CHLORIDE SERPL-SCNC: 104 MMOL/L
CHOLEST SERPL-MCNC: 138 MG/DL
CO2 SERPL-SCNC: 25 MMOL/L
CREAT SERPL-MCNC: 1.28 MG/DL
EGFR: 60 ML/MIN/1.73M2
EOSINOPHIL # BLD AUTO: 0.33 K/UL
EOSINOPHIL NFR BLD AUTO: 5.2 %
ESTIMATED AVERAGE GLUCOSE: 103 MG/DL
GLUCOSE SERPL-MCNC: 80 MG/DL
HBA1C MFR BLD HPLC: 5.2 %
HCT VFR BLD CALC: 47.6 %
HDLC SERPL-MCNC: 52 MG/DL
HGB BLD-MCNC: 15.3 G/DL
IMM GRANULOCYTES NFR BLD AUTO: 0.3 %
LDLC SERPL CALC-MCNC: 73 MG/DL
LYMPHOCYTES # BLD AUTO: 1.88 K/UL
LYMPHOCYTES NFR BLD AUTO: 29.8 %
MAN DIFF?: NORMAL
MCHC RBC-ENTMCNC: 29.1 PG
MCHC RBC-ENTMCNC: 32.1 GM/DL
MCV RBC AUTO: 90.7 FL
MONOCYTES # BLD AUTO: 0.36 K/UL
MONOCYTES NFR BLD AUTO: 5.7 %
NEUTROPHILS # BLD AUTO: 3.65 K/UL
NEUTROPHILS NFR BLD AUTO: 57.9 %
NONHDLC SERPL-MCNC: 86 MG/DL
PLATELET # BLD AUTO: 190 K/UL
POTASSIUM SERPL-SCNC: 5 MMOL/L
PROT SERPL-MCNC: 6.5 G/DL
PSA FREE FLD-MCNC: 20 %
PSA FREE SERPL-MCNC: 1.7 NG/ML
PSA SERPL-MCNC: 8.47 NG/ML
RBC # BLD: 5.25 M/UL
RBC # FLD: 13.9 %
SODIUM SERPL-SCNC: 143 MMOL/L
TRIGL SERPL-MCNC: 66 MG/DL
TSH SERPL-ACNC: 2.93 UIU/ML
WBC # FLD AUTO: 6.31 K/UL

## 2022-06-20 ENCOUNTER — APPOINTMENT (OUTPATIENT)
Dept: HEART AND VASCULAR | Facility: CLINIC | Age: 72
End: 2022-06-20
Payer: MEDICARE

## 2022-06-20 VITALS
BODY MASS INDEX: 26.54 KG/M2 | TEMPERATURE: 97.7 F | HEIGHT: 70 IN | SYSTOLIC BLOOD PRESSURE: 121 MMHG | OXYGEN SATURATION: 96 % | WEIGHT: 185.38 LBS | HEART RATE: 55 BPM | DIASTOLIC BLOOD PRESSURE: 75 MMHG

## 2022-06-20 PROCEDURE — 99204 OFFICE O/P NEW MOD 45 MIN: CPT

## 2022-06-20 NOTE — REASON FOR VISIT
[Symptom and Test Evaluation] : symptom and test evaluation [FreeTextEntry1] : 72 year old man presents for a visit. He is active and in good health. He occasionally gets winded. This is often noted with activity. Symptoms always improve at rest. Our objective today is to discuss several chronic medical conditions, with acute on chronic worsening and explore management options, including but not limited to medications, behavioral changes, additional testing or intervention.\par

## 2022-06-20 NOTE — DISCUSSION/SUMMARY
[FreeTextEntry1] : SOB will move forward with stress echocardiogram for further evaluation pending scheduling and insurance approval\par HLD KRYSTYNA and I discussed his lipid panel and individualized target LDL goal. At this point, will do diet and exercise with anticipation of re-evaluating labs in 3-6 months\par HTN - KRYSTYNA  and I had an extensive discussion regarding his blood pressure management. Patient will continue taking current medications in addition to maintaining a low Na diet, with periodic b/p checks at home.\par

## 2022-06-23 ENCOUNTER — APPOINTMENT (OUTPATIENT)
Dept: PHYSICAL MEDICINE AND REHAB | Facility: CLINIC | Age: 72
End: 2022-06-23
Payer: MEDICARE

## 2022-06-23 VITALS — WEIGHT: 185 LBS | HEIGHT: 70 IN | BODY MASS INDEX: 26.48 KG/M2

## 2022-06-23 DIAGNOSIS — G89.29 LOW BACK PAIN, UNSPECIFIED: ICD-10-CM

## 2022-06-23 DIAGNOSIS — M25.69 STIFFNESS OF OTHER SPECIFIED JOINT, NOT ELSEWHERE CLASSIFIED: ICD-10-CM

## 2022-06-23 DIAGNOSIS — M54.9 DORSALGIA, UNSPECIFIED: ICD-10-CM

## 2022-06-23 DIAGNOSIS — M47.819 SPONDYLOSIS W/OUT MYELOPATHY OR RADICULOPATHY, SITE UNSPECIFIED: ICD-10-CM

## 2022-06-23 DIAGNOSIS — M54.50 LOW BACK PAIN, UNSPECIFIED: ICD-10-CM

## 2022-06-23 PROCEDURE — 99204 OFFICE O/P NEW MOD 45 MIN: CPT

## 2022-06-23 NOTE — CONSULT LETTER
[FreeTextEntry1] : Dear Dr. EBER CARMONA  , \par \par I had the pleasure of evaluating your patient, KRYSTYNA HASSAN .\par \par Thank you very much for allowing me to participate in the care of this patient. If you have any questions, please do not hesitate to contact me. \par \par Sincerely, \par Timmy Schwartz MD \par \par ABPMR Board Certified in Physical Medicine and Rehabilitation\par Certified Fellow of AANEM (Neuromuscular and Electrodiagnostic Medicine)\par Subspecialty certified in Sports Medicine (ABPMR)\par \par  of Physical Medicine and Rehabilitation\par [employer] [facility] [employer] [facility]\par [facility]\par \par

## 2022-06-23 NOTE — ASSESSMENT
[FreeTextEntry1] : \par PLAN AND RECOMMENDATIONS :\par \par We discussed differential diagnosis and clinical impression.\par Likely lumbar myofascial pain vs arthritic changes in lumbar spine\par \par Recommend\par .symptomatic care and support\par Start PT for lumbar stretching and muscle strengthening\par  medications: Advil PRN- NSAIDS as needed -  OTC fine (-personal preference )-(once or twice a day), -warned of  possible GI side effects -advised to take with meals or add over the counter Nexium, if sensitive\par \par  imaging not needed at this time. Will consider XR lumbar spine if pain persists without improvement\par \par  hydrotherapy /heat / cold for pain\par  relative rest and avoidance of painful activity where possible \par  increasing activity as discussed \par  return for follow up in 6 weeks\par The patient had the opportunity to ask questions and all were answered to their satisfaction. We will coordinate treatment with the other members of the treatment team.\par The patient verbalized understanding of the management/plan rationale and agreed with my recommendations.\par \par time spent total 45 mins \par Total clinician time on the date of this encounter was at least 45 minutes- including\par \par 1 preparing to see the patient and review of prior notes, tests and other records \par 2 obtaining and reviewing and/ or confirming the history\par 3 performing a medically necessary, pertinent  and appropriate examination \par 4 ordering medications and supplements explaining side effects to look for ,tests,procedures,therapy and or specialty referrals\par 5 explaining the diagnosis or differential to the patient \par 6 communicating with other physicians or providers before during or after the visit.will send PCP a note \par \par

## 2022-06-23 NOTE — REVIEW OF SYSTEMS
[Muscle Pain] : muscle pain [Negative] : Psychiatric [Fever] : no fever [Lower Ext Edema] : no lower extremity edema [Joint Pain] : joint pain [Joint Stiffness] : joint stiffness [Muscle Weakness] : no muscle weakness [Difficulty Walking] : no difficulty walking

## 2022-06-23 NOTE — HISTORY OF PRESENT ILLNESS
[FreeTextEntry1] : Mr. KRYSTYNA HASSAN is a very pleasant 72 year male who seen for evaluation of low back pain that has been ongoing for many years without any specific injury or inciting event. He has history of left sciatica treated with PT and Advil, and history of B/L low back pain. Recently, he noted new right low back pain ongoing for 2 months without specific injury or inciting event. The pain is located primarily on the left side, intermittent in nature and described as dull and achy with occasional sharp pain. The pain is rated as 0/10 during today's visit, and ranges from 0-8/10. The pain usually occurs in the morning upon waking and subsides without intervention after walking around. The patient's symptoms are aggravated by bending and alleviated by rest and Advil. The patient denies any radiating symptoms to the lower extremities, numbness/tingling, weakness, or bowel/bladder dysfunction. The patient has no other complaints at this time. The pain does not wake him up at night. No recent imaging. 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

## 2022-06-23 NOTE — PHYSICAL EXAM
[FreeTextEntry1] : PHYSICAL EXAM : OBJECTIVE \par \par GENERAL : Awake ,alert and oriented to time place and person \par HEAD : normocephalic and atraumatic \par NECK : supple ,no tracheal deviation ,no thyroid enlargement noted with swallowing\par EYES : sclera and conjunctiva normal no redness,intact extraocular movements \par ENT  : ears and nose normal in appearance -hearing adequate \par PULMONARY: effort normal. No respiratory distress. breathing regular. No wheezes \par LYMPH : No swelling in limbs, capillary return within normal range \par CVS : warm extremities,no palpitations,not short of breath, no visible jugular venous distention\par PSYCH : mood and affect normal ,good eye contact ,normal attention \par ABDOMEN : no visible distension , \par NEUROLOGICAL:cranial nerves intact,muscle tone normal,gait and balance safe except where noted below \par SKIN : warm and dry No rash detected over specific body areas examined \par MUSCULOSKELETAL: normal muscle bulk, no focal bony tenderness /posture normal except where specified below\par Palpation: TTP to right lumbar paraspinal muscles; No midline TTP\par ROM: limited with pain at end ROM in lumbar flexion/extension/rotation\par Strength: 5/5 in B/L LE\par Sensation: intact B/L\par Reflexes: 2+ patellar reflexes B/L\par Special tests: negative straight leg raise B/L, positive facet loading on the right

## 2022-07-29 ENCOUNTER — APPOINTMENT (OUTPATIENT)
Dept: HEART AND VASCULAR | Facility: CLINIC | Age: 72
End: 2022-07-29

## 2022-07-29 VITALS
BODY MASS INDEX: 25.91 KG/M2 | TEMPERATURE: 97.9 F | HEIGHT: 70 IN | WEIGHT: 181 LBS | DIASTOLIC BLOOD PRESSURE: 80 MMHG | SYSTOLIC BLOOD PRESSURE: 128 MMHG | HEART RATE: 62 BPM | OXYGEN SATURATION: 99 %

## 2022-07-29 PROCEDURE — 99214 OFFICE O/P EST MOD 30 MIN: CPT

## 2022-07-29 PROCEDURE — 93351 STRESS TTE COMPLETE: CPT

## 2022-07-29 NOTE — REASON FOR VISIT
[Symptom and Test Evaluation] : symptom and test evaluation [FreeTextEntry1] : 72 year old man presents for a visit. He is active and in good health. He occasionally gets winded. This is often noted with activity. Symptoms always improve at rest. Our objective today is to discuss results of stress echo and additional testing.

## 2022-07-29 NOTE — DISCUSSION/SUMMARY
[FreeTextEntry1] : HTN - KRYSTYNA  and I had an extensive discussion regarding his blood pressure management. Patient will continue taking current medications in addition to maintaining a low Na diet, with periodic b/p checks at home. monitor b/p at home\par HLD KRYSTYNA and I discussed his lipid panel and individualized target LDL goal. At this point, will do diet and exercise with anticipation of re-evaluating labs in 3-6 months check carotid u/s if able to approve and schedule \par SOB Inclined towards a conservative follow up in this patient. We had a careful discussion regarding diet and exercise. Will be happy to re-evaluate.\par results reviewed.

## 2022-08-16 ENCOUNTER — TRANSCRIPTION ENCOUNTER (OUTPATIENT)
Age: 72
End: 2022-08-16

## 2022-08-23 DIAGNOSIS — H91.90 UNSPECIFIED HEARING LOSS, UNSPECIFIED EAR: ICD-10-CM

## 2022-08-25 ENCOUNTER — TRANSCRIPTION ENCOUNTER (OUTPATIENT)
Age: 72
End: 2022-08-25

## 2022-09-08 ENCOUNTER — APPOINTMENT (OUTPATIENT)
Dept: FAMILY MEDICINE | Facility: CLINIC | Age: 72
End: 2022-09-08

## 2022-09-14 LAB
25(OH)D3 SERPL-MCNC: 41.2 NG/ML
ALBUMIN SERPL ELPH-MCNC: 4.4 G/DL
ALP BLD-CCNC: 99 U/L
ALT SERPL-CCNC: 19 U/L
ANION GAP SERPL CALC-SCNC: 16 MMOL/L
AST SERPL-CCNC: 18 U/L
BASOPHILS # BLD AUTO: 0.04 K/UL
BASOPHILS NFR BLD AUTO: 0.7 %
BILIRUB SERPL-MCNC: 0.6 MG/DL
BUN SERPL-MCNC: 16 MG/DL
CALCIUM SERPL-MCNC: 9 MG/DL
CHLORIDE SERPL-SCNC: 105 MMOL/L
CHOLEST SERPL-MCNC: 129 MG/DL
CO2 SERPL-SCNC: 22 MMOL/L
CREAT SERPL-MCNC: 1.14 MG/DL
EGFR: 69 ML/MIN/1.73M2
EOSINOPHIL # BLD AUTO: 0.15 K/UL
EOSINOPHIL NFR BLD AUTO: 2.7 %
ESTIMATED AVERAGE GLUCOSE: 105 MG/DL
GLUCOSE SERPL-MCNC: 96 MG/DL
HBA1C MFR BLD HPLC: 5.3 %
HCT VFR BLD CALC: 48.2 %
HDLC SERPL-MCNC: 50 MG/DL
HGB BLD-MCNC: 15.5 G/DL
IMM GRANULOCYTES NFR BLD AUTO: 0.5 %
LDLC SERPL CALC-MCNC: 66 MG/DL
LYMPHOCYTES # BLD AUTO: 1.55 K/UL
LYMPHOCYTES NFR BLD AUTO: 27.9 %
MAN DIFF?: NORMAL
MCHC RBC-ENTMCNC: 28.9 PG
MCHC RBC-ENTMCNC: 32.2 GM/DL
MCV RBC AUTO: 89.8 FL
MONOCYTES # BLD AUTO: 0.34 K/UL
MONOCYTES NFR BLD AUTO: 6.1 %
NEUTROPHILS # BLD AUTO: 3.44 K/UL
NEUTROPHILS NFR BLD AUTO: 62.1 %
NONHDLC SERPL-MCNC: 79 MG/DL
PLATELET # BLD AUTO: 192 K/UL
POTASSIUM SERPL-SCNC: 4.6 MMOL/L
PROT SERPL-MCNC: 6.6 G/DL
PSA FREE FLD-MCNC: 18 %
PSA FREE SERPL-MCNC: 1.64 NG/ML
PSA SERPL-MCNC: 9.37 NG/ML
RBC # BLD: 5.37 M/UL
RBC # FLD: 13.7 %
SODIUM SERPL-SCNC: 142 MMOL/L
TRIGL SERPL-MCNC: 67 MG/DL
TSH SERPL-ACNC: 3.09 UIU/ML
WBC # FLD AUTO: 5.55 K/UL

## 2022-09-19 ENCOUNTER — APPOINTMENT (OUTPATIENT)
Dept: FAMILY MEDICINE | Facility: CLINIC | Age: 72
End: 2022-09-19

## 2022-09-19 VITALS
SYSTOLIC BLOOD PRESSURE: 133 MMHG | HEART RATE: 58 BPM | DIASTOLIC BLOOD PRESSURE: 80 MMHG | WEIGHT: 186 LBS | TEMPERATURE: 97.2 F | BODY MASS INDEX: 26.63 KG/M2 | OXYGEN SATURATION: 96 % | HEIGHT: 70 IN

## 2022-09-19 DIAGNOSIS — R79.89 OTHER SPECIFIED ABNORMAL FINDINGS OF BLOOD CHEMISTRY: ICD-10-CM

## 2022-09-19 DIAGNOSIS — Z23 ENCOUNTER FOR IMMUNIZATION: ICD-10-CM

## 2022-09-19 DIAGNOSIS — F32.A DEPRESSION, UNSPECIFIED: ICD-10-CM

## 2022-09-19 PROCEDURE — 90662 IIV NO PRSV INCREASED AG IM: CPT

## 2022-09-19 PROCEDURE — G0008: CPT

## 2022-09-19 PROCEDURE — 99214 OFFICE O/P EST MOD 30 MIN: CPT | Mod: 25

## 2022-09-19 NOTE — PLAN
[FreeTextEntry1] : mood \par stable \par cont meds \par \par back pain \par improved with PT, will cont \par encouraged exercise and gentle stretching \par \par cholesterol \par cont meds \par reviewed labs with patient \par encouraged low fat diet and exercise \par \par psa\par elevated \par follow up pending \par \par bp \par stable \par cont meds\par encouraged low salt diet and exercise\par \par follow up pending with cardio \par reviewed cbc, comp, tsh, a1c, chol, (elevated cr resolved with hydration) elevated PSA

## 2022-09-19 NOTE — HISTORY OF PRESENT ILLNESS
[FreeTextEntry1] : bp check \par mood medication\par cholesterol check  [de-identified] : 73 yo m presents to follow up labs, recently were drawn. \par Here for a bp check. \par On medication for mood, here to discuss.

## 2022-09-20 ENCOUNTER — TRANSCRIPTION ENCOUNTER (OUTPATIENT)
Age: 72
End: 2022-09-20

## 2022-09-23 ENCOUNTER — NON-APPOINTMENT (OUTPATIENT)
Age: 72
End: 2022-09-23

## 2022-09-27 ENCOUNTER — TRANSCRIPTION ENCOUNTER (OUTPATIENT)
Age: 72
End: 2022-09-27

## 2022-10-26 ENCOUNTER — APPOINTMENT (OUTPATIENT)
Dept: HEART AND VASCULAR | Facility: CLINIC | Age: 72
End: 2022-10-26

## 2022-10-26 VITALS
SYSTOLIC BLOOD PRESSURE: 143 MMHG | BODY MASS INDEX: 27.42 KG/M2 | HEIGHT: 70 IN | HEART RATE: 55 BPM | DIASTOLIC BLOOD PRESSURE: 85 MMHG | TEMPERATURE: 98 F | OXYGEN SATURATION: 96 % | WEIGHT: 191.56 LBS

## 2022-10-26 PROCEDURE — 93880 EXTRACRANIAL BILAT STUDY: CPT

## 2022-10-26 PROCEDURE — 99214 OFFICE O/P EST MOD 30 MIN: CPT

## 2022-10-27 NOTE — REASON FOR VISIT
[Symptom and Test Evaluation] : symptom and test evaluation [FreeTextEntry1] : 72 year old man presents for a visit. He is active and in good health. He occasionally gets winded. This is often noted with activity. Symptoms always improve at rest. Our objective today is to discuss any new symptoms and further care.

## 2022-10-27 NOTE — DISCUSSION/SUMMARY
[FreeTextEntry1] : Borderline HTN - KRYSTYNA  and I had an extensive discussion regarding his blood pressure management. Patient will continue taking current medications in addition to maintaining a low Na diet, with periodic b/p checks at home.\par HLD KRYSTYNA and I discussed his lipid panel and individualized target LDL goal. At this point, will do diet and exercise with anticipation of re-evaluating labs in 3-6 months\par RTC 1 year

## 2022-11-09 ENCOUNTER — RX RENEWAL (OUTPATIENT)
Age: 72
End: 2022-11-09

## 2022-11-18 ENCOUNTER — NON-APPOINTMENT (OUTPATIENT)
Age: 72
End: 2022-11-18

## 2022-11-30 ENCOUNTER — APPOINTMENT (OUTPATIENT)
Dept: UROLOGY | Facility: CLINIC | Age: 72
End: 2022-11-30

## 2022-11-30 VITALS
DIASTOLIC BLOOD PRESSURE: 73 MMHG | OXYGEN SATURATION: 96 % | HEART RATE: 62 BPM | SYSTOLIC BLOOD PRESSURE: 126 MMHG | TEMPERATURE: 94.6 F

## 2022-11-30 PROCEDURE — 51798 US URINE CAPACITY MEASURE: CPT

## 2022-11-30 PROCEDURE — 99214 OFFICE O/P EST MOD 30 MIN: CPT | Mod: 25

## 2022-12-01 LAB
BILIRUB UR QL STRIP: NORMAL
CLARITY UR: CLEAR
COLLECTION METHOD: NORMAL
GLUCOSE UR-MCNC: NORMAL
HCG UR QL: 1 EU/DL
HGB UR QL STRIP.AUTO: NORMAL
KETONES UR-MCNC: NORMAL
LEUKOCYTE ESTERASE UR QL STRIP: NORMAL
NITRITE UR QL STRIP: NORMAL
PH UR STRIP: 7
PROT UR STRIP-MCNC: NORMAL
PSA FREE FLD-MCNC: 18 %
PSA FREE SERPL-MCNC: 1.76 NG/ML
PSA SERPL-MCNC: 9.61 NG/ML
SP GR UR STRIP: 1.02

## 2022-12-01 NOTE — PHYSICAL EXAM
[General Appearance - Well Developed] : well developed [General Appearance - Well Nourished] : well nourished [Normal Appearance] : normal appearance [Well Groomed] : well groomed [General Appearance - In No Acute Distress] : no acute distress [Urethral Meatus] : meatus normal [Penis Abnormality] : normal circumcised penis [Urinary Bladder Findings] : the bladder was normal on palpation [Scrotum] : the scrotum was normal [Testes Mass (___cm)] : there were no testicular masses [Prostate Tenderness] : the prostate was not tender [No Prostate Nodules] : no prostate nodules [Edema] : no peripheral edema [] : no respiratory distress [Exaggerated Use Of Accessory Muscles For Inspiration] : no accessory muscle use [Oriented To Time, Place, And Person] : oriented to person, place, and time [Affect] : the affect was normal [Mood] : the mood was normal [Not Anxious] : not anxious [Normal Station and Gait] : the gait and station were normal for the patient's age [No Focal Deficits] : no focal deficits [FreeTextEntry1] : nontender, nonnodular prostate

## 2022-12-01 NOTE — HISTORY OF PRESENT ILLNESS
[FreeTextEntry1] : Pt is a 73 yo M with family hx of prostate CA and an elevated PSA - PSA from 9/8/2022 was 9.37 ng/mL, elevated from 8.47 on 6/10/22. MRI from 6/25/21 showed no PIRAD 4 lesion; he does have a PIRADS 2 lesion. He presents today for a follow-up visit and reports he feels well.  \par \par Pt continues to take Tamsulosin 0.4mg daily, and states that his urinary stream is strong. \par \par PSA hx:\par 7/24/2020: 8.18 ng/mL\par 5/19/2021: 8.68 ng/mL\par 12/14/2021: 7.71 ng/mL\par 6/10/2022: 8.47 ng/mL \par 9/8/2022: 9.37 ng/mL \par \par Udip: negative\par PVR: 11 cc (to rule out incomplete bladder emptying)\par \par 12/14/2021-- 71 year old male with family history of prostate cancer and elevated PSA presenting for annual wellness exam.He was last seen by Dr. Quintana 6/25/21 and underwent a repeat MRI which was unremarkable for high grade lesion, (previous PIRADS 4 lesion not seen).  He was started on tamsulosin 0.4 mg for mild obstructive symptoms.  He is feeling well with no urinary complaints.  \par \par He has chronic back pain. \par \par MRI 6/25/21-- no PIRAD 4 lesion seen PIRAD 2 lesion low risk\par PVR: 84 cc (to rule out incomplete bladder emptying) \par \par Full Hx: \par Pt is a former patient of Dr. Nelson's who has transitioned care to me.   He has a family history of prostate cancer and an elevated PSA.  He last presented to me 7/7/2020. His most recent PSA drawn at his PCP's office was 7.6.  In the past he underwent an MRI guided prostate biopsy at [facility] which was inconclusive, (6/2019)\par \par He had 1-2 episodes of nocturia per night.  He stated he "occasionally" will have to void several times an hour but this was not a regular pattern. 4K sent. \par \par Udip: negative\par \par PSA history: \par 7/20 8.18 4K 6%\par 6/20 7.6  \par 3/19 8.5   4K 33%  (4/2019)\par 6/18 7.1\par 2/18 8.2\par 5/19 8.68\par \par PMH:  hyperparathyroid, HTN, hyperchol\par PSH:  devi, cataract, parathyroidectomy \par FamHx: prostate cancer (brother)\par No tob, occasional alcohol\par \par

## 2022-12-01 NOTE — ADDENDUM
[FreeTextEntry1] : A portion of this note was written by [So Broussard] on 11/30/2022  acting as a scribe for Dr. Cardenas. \par \par I have personally reviewed the chart and agree that the record accurately reflects my personal performance and the history, physical exam, assessment, and plan.\par

## 2022-12-01 NOTE — ASSESSMENT
[FreeTextEntry1] : Pt is a 73 yo M with family hx of prostate CA and an elevated PSA - PSA from 9/8/2022 was 9.37 ng/mL, elevated from 8.47 on 6/10/22. MRI from 6/25/21 showed no PIRAD 4 lesion; he does have a PIRADS 2 lesion.   2 prior bx at [place] were inconclusive. \par \par Pt continues to take Tamsulosin 0.4mg daily, and states that his urinary stream is good. \par \par PSA hx:\par 7/24/2020: 8.18 ng/mL\par 5/19/2021: 8.68 ng/mL\par 12/14/2021: 7.71 ng/mL\par 6/10/2022: 8.47 ng/mL \par 9/8/2022: 9.37 ng/mL \par \par Today's physical exam showed a non-nodular, non-tender prostate. \par \par Given his elevated PSA results from 9/8/2022, his PSA levels were redrawn today. Pt will schedule a virtual visit early next week to discuss PSA results. \par \par Patient expressed understanding.

## 2022-12-12 ENCOUNTER — APPOINTMENT (OUTPATIENT)
Dept: UROLOGY | Facility: CLINIC | Age: 72
End: 2022-12-12

## 2022-12-12 PROCEDURE — ZZZZZ: CPT

## 2022-12-12 NOTE — ADDENDUM
[FreeTextEntry1] : A portion of this note was written by [oS Broussard] on 12/12/2022  acting as a scribe for Dr. Cardenas. \par \par I have personally reviewed the chart and agree that the record accurately reflects my personal performance and the history, physical exam, assessment, and plan.\par

## 2022-12-12 NOTE — ASSESSMENT
[FreeTextEntry1] : Pt is a 73 yo M with family hx of prostate CA and an elevated PSA - PSA from 9/8/2022 was 9.37ng/mL, elevated from 8.47 on 6/10/22. MRI from 6/25/2021 showed no PIRAD 4 lesion; he does have a PIRADs 2 lesion. Two prior biopsies at [place] were inconclusive. Pt takes Tamsulosin 0/4mg daily. He presents today for a telephonic visit to discuss PSA results from 11/30/22 - PSA was 9.61 ng/mL.\par \par We reviewed his PSA results, and in the setting of rising PSA levels, he will undergo a repeat prostate MRI. \par \par Pt will undergo the MRI at [facility] where his prior MRI from 6/25/2021 was conducted so the imaging can be compared.  \par \par Our office will email him an MRI requisition form. \par \par Pt will schedule a virtual visit with me to discuss MRI results. \par \par Patient expressed understanding.\par

## 2022-12-12 NOTE — HISTORY OF PRESENT ILLNESS
[Other Location: e.g. School (Enter Location, City,State)___] : at [unfilled], at the time of the visit. [FreeTextEntry1] : Pt is a 71 yo M with family hx of prostate CA and an elevated PSA - PSA from 9/8/2022 was 9.37ng/mL, elevated from 8.47 on 6/10/22. MRI from 6/25/2021 showed no PIRAD 4 lesion; he does have a PIRADs 2 lesion. Two prior biopsies at [place] were inconclusive. Pt takes Tamsulosin 0/4mg daily. He presents today for a telephonic visit to discuss PSA results from 11/30/22 - PSA was 9.61 ng/mL.\par \par 11/30/22-- Pt is a 71 yo M with family hx of prostate CA and an elevated PSA - PSA from 9/8/2022 was 9.37 ng/mL, elevated from 8.47 on 6/10/22. MRI from 6/25/21 showed no PIRAD 4 lesion; he does have a PIRADS 2 lesion. He presents today for a follow-up visit and reports he feels well.  \par \par Pt continues to take Tamsulosin 0.4mg daily, and states that his urinary stream is strong. \par \par PSA hx:\par 7/24/2020: 8.18 ng/mL\par 5/19/2021: 8.68 ng/mL\par 12/14/2021: 7.71 ng/mL\par 6/10/2022: 8.47 ng/mL \par 9/8/2022: 9.37 ng/mL \par \par Udip: negative\par PVR: 11 cc (to rule out incomplete bladder emptying)\par \par 12/14/2021-- 71 year old male with family history of prostate cancer and elevated PSA presenting for annual wellness exam.He was last seen by Dr. Quintana 6/25/21 and underwent a repeat MRI which was unremarkable for high grade lesion, (previous PIRADS 4 lesion not seen).  He was started on tamsulosin 0.4 mg for mild obstructive symptoms.  He is feeling well with no urinary complaints.  \par \par He has chronic back pain. \par \par MRI 6/25/21-- no PIRAD 4 lesion seen PIRAD 2 lesion low risk\par PVR: 84 cc (to rule out incomplete bladder emptying) \par \par Full Hx: \par Pt is a former patient of Dr. Nelson's who has transitioned care to me.   He has a family history of prostate cancer and an elevated PSA.  He last presented to me 7/7/2020. His most recent PSA drawn at his PCP's office was 7.6.  In the past he underwent an MRI guided prostate biopsy at [facility] which was inconclusive, (6/2019)\par \par He had 1-2 episodes of nocturia per night.  He stated he "occasionally" will have to void several times an hour but this was not a regular pattern. 4K sent. \par \par Udip: negative\par \par PSA history: \par 7/20 8.18 4K 6%\par 6/20 7.6  \par 3/19 8.5   4K 33%  (4/2019)\par 6/18 7.1\par 2/18 8.2\par 5/19 8.68\par \par PMH:  hyperparathyroid, HTN, hyperchol\par PSH:  devi, cataract, parathyroidectomy \par FamHx: prostate cancer (brother)\par No tob, occasional alcohol\par \par

## 2022-12-15 ENCOUNTER — RX RENEWAL (OUTPATIENT)
Age: 72
End: 2022-12-15

## 2022-12-27 ENCOUNTER — NON-APPOINTMENT (OUTPATIENT)
Age: 72
End: 2022-12-27

## 2023-01-09 ENCOUNTER — RX RENEWAL (OUTPATIENT)
Age: 73
End: 2023-01-09

## 2023-01-11 ENCOUNTER — NON-APPOINTMENT (OUTPATIENT)
Age: 73
End: 2023-01-11

## 2023-01-18 ENCOUNTER — APPOINTMENT (OUTPATIENT)
Dept: UROLOGY | Facility: CLINIC | Age: 73
End: 2023-01-18
Payer: MEDICARE

## 2023-01-18 PROCEDURE — 99213 OFFICE O/P EST LOW 20 MIN: CPT | Mod: 95

## 2023-01-19 NOTE — HISTORY OF PRESENT ILLNESS
Impression: Primary open-angle glaucoma, bilateral, moderate stage: F36.4281. OCT performed 10/9/2020 appears stable OU. Vf OS generalized depression. Plan: Vf 24-2 ordered today, findings explained to patient. Stable. IOPs doing well on current treatment.  Continue latanoprost QHS OS, Simbrinza BID OU, and Timolol BID OU [Other Location: e.g. School (Enter Location, City,State)___] : at [unfilled], at the time of the visit. [Medical Office: ([facility])___] : at the medical office located in  [Verbal consent obtained from patient] : the patient, [unfilled] [FreeTextEntry1] : Pt is a 71 yo M with a family hx of prostate CA, and an elevated PSA level - Pt's PSA level from 11/30/22 was 9.61 ng/mL, up from 9.37ng/mL on 9/8/22. MRI from 6/25/2021 showed a PIRAD 2 lesion, but did not show a previously observed PIRAD 4 lesion. Pt underwent a prostate MRI on 12/27/22 and presents today for a telehealth visit to discuss MRI results. \par \par Pt has undergone two prostate biopsies - both biopsies inconclusive, (see below). \par \par Prostate MRI: 12/27/22-- very severe BPH. prostate volume is increased from 85 cc up to 106 cc. In the R posterior peripheral zone at the level of the mid gland there is a new tiny 4mm focus of restricted diffusion with no definitive abnormality on the T2 sequences and only linear enhancement on the postcontrast imaging most likely representing localized minimal prostatitis. since this is a new finding follow-up in a year is recommended (PI-RADS 3). 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 \par - diverticulosis. no diverticulitis or bowel obstruction. \par - new fat containing 3.7 x 2.3 x 3.9cm R femoral hernia. \par \par 12/12/22-- Pt is a 71 yo M with family hx of prostate CA and an elevated PSA - PSA from 9/8/2022 was 9.37ng/mL, elevated from 8.47 on 6/10/22. MRI from 6/25/2021 showed no PIRAD 4 lesion; he does have a PIRADs 2 lesion. Two prior biopsies at [place] were inconclusive. Pt takes Tamsulosin 0/4mg daily. He presents today for a telephonic visit to discuss PSA results from 11/30/22 - PSA was 9.61 ng/mL.\par \par 11/30/22-- Pt is a 71 yo M with family hx of prostate CA and an elevated PSA - PSA from 9/8/2022 was 9.37 ng/mL, elevated from 8.47 on 6/10/22. MRI from 6/25/21 showed no PIRAD 4 lesion; he does have a PIRADS 2 lesion. He presents today for a follow-up visit and reports he feels well.  \par \par Pt continues to take Tamsulosin 0.4mg daily, and states that his urinary stream is strong. \par \par PSA hx:\par 7/24/2020: 8.18 ng/mL\par 5/19/2021: 8.68 ng/mL\par 12/14/2021: 7.71 ng/mL\par 6/10/2022: 8.47 ng/mL \par 9/8/2022: 9.37 ng/mL \par \par Udip: negative\par PVR: 11 cc (to rule out incomplete bladder emptying)\par \par 12/14/2021-- 71 year old male with family history of prostate cancer and elevated PSA presenting for annual wellness exam.He was last seen by Dr. Quintana 6/25/21 and underwent a repeat MRI which was unremarkable for high grade lesion, (previous PIRADS 4 lesion not seen).  He was started on tamsulosin 0.4 mg for mild obstructive symptoms.  He is feeling well with no urinary complaints.  \par \par He has chronic back pain. \par \par MRI 6/25/21-- no PIRAD 4 lesion seen PIRAD 2 lesion low risk\par PVR: 84 cc (to rule out incomplete bladder emptying) \par \par Full Hx: \par Pt is a former patient of Dr. Nelson's who has transitioned care to me.   He has a family history of prostate cancer and an elevated PSA.  He last presented to me 7/7/2020. His most recent PSA drawn at his PCP's office was 7.6.  In the past he underwent an MRI guided prostate biopsy at [facility] which was inconclusive, (6/2019)\par \par He had 1-2 episodes of nocturia per night.  He stated he "occasionally" will have to void several times an hour but this was not a regular pattern. 4K sent. \par \par Udip: negative\par \par PSA history: \par 7/20 8.18 4K 6%\par 6/20 7.6  \par 3/19 8.5   4K 33%  (4/2019)\par 6/18 7.1\par 2/18 8.2\par 5/19 8.68\par \par PMH:  hyperparathyroid, HTN, hyperchol\par PSH:  devi, cataract, parathyroidectomy \par FamHx: prostate cancer (brother)\par No tob, occasional alcohol\par \par

## 2023-01-19 NOTE — ASSESSMENT
[FreeTextEntry1] : Pt is a 73 yo M with a family hx of prostate CA, and an elevated PSA level - Pt's PSA level from 11/30/22 was 9.61 ng/mL, up from 9.37ng/mL on 9/8/22. MRI from 6/25/2021 showed a PIRAD 2 lesion, but did not show a previously observed PIRAD 4 lesion. Pt underwent a prostate MRI on 12/27/22 and presents today for a telehealth visit to discuss MRI results. \par \par Pt has undergone two prostate biopsies - both biopsies were inconclusive. \par \par Prostate MRI: 12/27/22-- very severe BPH. prostate volume is increased from 85 cc up to 106 cc. In the R posterior peripheral zone at the level of the mid gland there is a new tiny 4mm focus of restricted diffusion with no definitive abnormality on the T2 sequences and only linear enhancement on the postcontrast imaging most likely representing localized minimal prostatitis. since this is a new finding follow-up in a year is recommended (PI-RADS 3). 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 \par - diverticulosis. no diverticulitis or bowel obstruction. \par - new fat containing 3.7 x 2.3 x 3.9cm R femoral hernia. \par \par We reviewed his MRI results, which include a new finding of a PIRAD 3 lesion. I advised that he schedule an appointment to meet with Dr. Quintana to reassess if he should undergo a repeat prostate biopsy.   Will have Dr. Quintana's office reach out to arrange appt. \par \par Patient expressed understanding.

## 2023-01-19 NOTE — ADDENDUM
[FreeTextEntry1] : A portion of this note was written by [So Broussard] on 01/18/2023 acting as a scribe for Dr. Cardenas. \par \par I have personally reviewed the chart and agree that the record accurately reflects my personal performance and the history, physical exam, assessment, and plan.\par

## 2023-01-23 ENCOUNTER — APPOINTMENT (OUTPATIENT)
Dept: FAMILY MEDICINE | Facility: CLINIC | Age: 73
End: 2023-01-23
Payer: MEDICARE

## 2023-01-23 VITALS
HEART RATE: 56 BPM | HEIGHT: 70 IN | DIASTOLIC BLOOD PRESSURE: 81 MMHG | TEMPERATURE: 98 F | WEIGHT: 190 LBS | SYSTOLIC BLOOD PRESSURE: 136 MMHG | BODY MASS INDEX: 27.2 KG/M2 | OXYGEN SATURATION: 95 %

## 2023-01-23 DIAGNOSIS — Z87.39 PERSONAL HISTORY OF OTHER DISEASES OF THE MUSCULOSKELETAL SYSTEM AND CONNECTIVE TISSUE: ICD-10-CM

## 2023-01-23 PROCEDURE — 99214 OFFICE O/P EST MOD 30 MIN: CPT | Mod: 25

## 2023-01-23 PROCEDURE — 36415 COLL VENOUS BLD VENIPUNCTURE: CPT

## 2023-01-23 NOTE — PHYSICAL EXAM
[Normal Sclera/Conjunctiva] : normal sclera/conjunctiva [Normal Outer Ear/Nose] : the outer ears and nose were normal in appearance [Normal] : affect was normal and insight and judgment were intact done

## 2023-01-23 NOTE — PLAN
[FreeTextEntry1] : mood \par stable \par cont meds \par \par cholesterol \par cont meds \par reviewed labs with patient \par encouraged low fat diet and exercise \par \par psa\par elevated \par follow up pending \par \par bp \par stable \par cont meds\par encouraged low salt diet and exercise

## 2023-01-23 NOTE — HISTORY OF PRESENT ILLNESS
[FreeTextEntry1] : s/p covid \par repeat blood work \par  [de-identified] : 73 yo m presents to discuss covid. \par Was covid pos 2 weeks ago, feeling much better. \par S/p paxlovid. \par \par Here for repeat blood work today.

## 2023-01-24 ENCOUNTER — APPOINTMENT (OUTPATIENT)
Dept: RADIOLOGY | Facility: CLINIC | Age: 73
End: 2023-01-24
Payer: MEDICARE

## 2023-01-24 ENCOUNTER — OUTPATIENT (OUTPATIENT)
Dept: OUTPATIENT SERVICES | Facility: HOSPITAL | Age: 73
LOS: 1 days | End: 2023-01-24

## 2023-01-24 DIAGNOSIS — Z90.89 ACQUIRED ABSENCE OF OTHER ORGANS: Chronic | ICD-10-CM

## 2023-01-24 DIAGNOSIS — Z90.49 ACQUIRED ABSENCE OF OTHER SPECIFIED PARTS OF DIGESTIVE TRACT: Chronic | ICD-10-CM

## 2023-01-24 LAB
ALBUMIN SERPL ELPH-MCNC: 4.4 G/DL
ALP BLD-CCNC: 96 U/L
ALT SERPL-CCNC: 23 U/L
ANION GAP SERPL CALC-SCNC: 13 MMOL/L
AST SERPL-CCNC: 19 U/L
BILIRUB SERPL-MCNC: 0.7 MG/DL
BUN SERPL-MCNC: 15 MG/DL
CALCIUM SERPL-MCNC: 9.5 MG/DL
CHLORIDE SERPL-SCNC: 104 MMOL/L
CHOLEST SERPL-MCNC: 132 MG/DL
CO2 SERPL-SCNC: 25 MMOL/L
CREAT SERPL-MCNC: 1.31 MG/DL
EGFR: 58 ML/MIN/1.73M2
ESTIMATED AVERAGE GLUCOSE: 108 MG/DL
GLUCOSE SERPL-MCNC: 89 MG/DL
HBA1C MFR BLD HPLC: 5.4 %
HDLC SERPL-MCNC: 42 MG/DL
LDLC SERPL CALC-MCNC: 72 MG/DL
NONHDLC SERPL-MCNC: 90 MG/DL
POTASSIUM SERPL-SCNC: 4.7 MMOL/L
PROT SERPL-MCNC: 7 G/DL
SODIUM SERPL-SCNC: 142 MMOL/L
TRIGL SERPL-MCNC: 91 MG/DL

## 2023-01-24 PROCEDURE — 77086 VRT FRACTURE ASSMT VIA DXA: CPT | Mod: 26

## 2023-01-25 ENCOUNTER — NON-APPOINTMENT (OUTPATIENT)
Age: 73
End: 2023-01-25

## 2023-01-26 ENCOUNTER — NON-APPOINTMENT (OUTPATIENT)
Age: 73
End: 2023-01-26

## 2023-02-06 ENCOUNTER — APPOINTMENT (OUTPATIENT)
Dept: NEUROLOGY | Facility: CLINIC | Age: 73
End: 2023-02-06
Payer: MEDICARE

## 2023-02-06 VITALS — OXYGEN SATURATION: 97 % | DIASTOLIC BLOOD PRESSURE: 74 MMHG | SYSTOLIC BLOOD PRESSURE: 120 MMHG | HEART RATE: 62 BPM

## 2023-02-06 VITALS
WEIGHT: 190 LBS | DIASTOLIC BLOOD PRESSURE: 66 MMHG | OXYGEN SATURATION: 96 % | SYSTOLIC BLOOD PRESSURE: 104 MMHG | HEART RATE: 66 BPM | TEMPERATURE: 97.3 F | HEIGHT: 70 IN | BODY MASS INDEX: 27.2 KG/M2

## 2023-02-06 DIAGNOSIS — R41.89 OTHER SYMPTOMS AND SIGNS INVOLVING COGNITIVE FUNCTIONS AND AWARENESS: ICD-10-CM

## 2023-02-06 PROCEDURE — 99204 OFFICE O/P NEW MOD 45 MIN: CPT

## 2023-02-07 LAB
FOLATE SERPL-MCNC: 7.6 NG/ML
TSH SERPL-ACNC: 3.64 UIU/ML
VIT B12 SERPL-MCNC: 718 PG/ML

## 2023-02-10 ENCOUNTER — APPOINTMENT (OUTPATIENT)
Dept: UROLOGY | Facility: CLINIC | Age: 73
End: 2023-02-10
Payer: MEDICARE

## 2023-02-10 VITALS
HEART RATE: 132 BPM | DIASTOLIC BLOOD PRESSURE: 70 MMHG | SYSTOLIC BLOOD PRESSURE: 115 MMHG | OXYGEN SATURATION: 81 % | TEMPERATURE: 98 F

## 2023-02-10 DIAGNOSIS — N40.1 BENIGN PROSTATIC HYPERPLASIA WITH LOWER URINARY TRACT SYMPMS: ICD-10-CM

## 2023-02-10 DIAGNOSIS — N13.8 BENIGN PROSTATIC HYPERPLASIA WITH LOWER URINARY TRACT SYMPMS: ICD-10-CM

## 2023-02-10 LAB
BILIRUB UR QL STRIP: NORMAL
CLARITY UR: CLEAR
COLLECTION METHOD: NORMAL
GLUCOSE UR-MCNC: NORMAL
HCG UR QL: 0.2 EU/DL
HGB UR QL STRIP.AUTO: NORMAL
KETONES UR-MCNC: NORMAL
LEUKOCYTE ESTERASE UR QL STRIP: NORMAL
NITRITE UR QL STRIP: NORMAL
PH UR STRIP: 5.5
PROT UR STRIP-MCNC: NORMAL
SP GR UR STRIP: 1.02

## 2023-02-10 PROCEDURE — 99214 OFFICE O/P EST MOD 30 MIN: CPT

## 2023-02-10 NOTE — HISTORY OF PRESENT ILLNESS
[FreeTextEntry1] : 1/18/23: [Dr. Cardenas Previous note]\par Pt is a 71 yo M with a family hx of prostate CA, and an elevated PSA level - Pt's PSA level from 11/30/22 was 9.61 ng/mL, up from 9.37ng/mL on 9/8/22. MRI from 6/25/2021 showed a PIRAD 2 lesion, but did not show a previously observed PIRAD 4 lesion. Pt underwent a prostate MRI on 12/27/22 and presents today for a telehealth visit to discuss MRI results. \par \par Pt has undergone two prostate biopsies - both biopsies inconclusive, (see below). \par \par Prostate MRI: 12/27/22-- very severe BPH. prostate volume is increased from 85 cc up to 106 cc. In the R posterior peripheral zone at the level of the mid gland there is a new tiny 4mm focus of restricted diffusion with no definitive abnormality on the T2 sequences and only linear enhancement on the postcontrast imaging most likely representing localized minimal prostatitis. since this is a new finding follow-up in a year is recommended (PI-RADS 3). 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 \par - diverticulosis. no diverticulitis or bowel obstruction. \par - new fat containing 3.7 x 2.3 x 3.9cm R femoral hernia. \par \par 12/12/22-- Pt is a 71 yo M with family hx of prostate CA and an elevated PSA - PSA from 9/8/2022 was 9.37ng/mL, elevated from 8.47 on 6/10/22. MRI from 6/25/2021 showed no PIRAD 4 lesion; he does have a PIRADs 2 lesion. Two prior biopsies at [facility] were inconclusive. Pt takes Tamsulosin 0/4mg daily. He presents today for a telephonic visit to discuss PSA results from 11/30/22 - PSA was 9.61 ng/mL.\par \par 11/30/22-- Pt is a 71 yo M with family hx of prostate CA and an elevated PSA - PSA from 9/8/2022 was 9.37 ng/mL, elevated from 8.47 on 6/10/22. MRI from 6/25/21 showed no PIRAD 4 lesion; he does have a PIRADS 2 lesion. He presents today for a follow-up visit and reports he feels well.  \par \par Pt continues to take Tamsulosin 0.4mg daily, and states that his urinary stream is strong. \par \par PSA hx:\par 7/24/2020: 8.18 ng/mL\par 5/19/2021: 8.68 ng/mL\par 12/14/2021: 7.71 ng/mL\par 6/10/2022: 8.47 ng/mL \par 9/8/2022: 9.37 ng/mL \par \par Udip: negative\par PVR: 11 cc (to rule out incomplete bladder emptying)\par \par 12/14/2021-- 71 year old male with family history of prostate cancer and elevated PSA presenting for annual wellness exam.He was last seen by Dr. Quintana 6/25/21 and underwent a repeat MRI which was unremarkable for high grade lesion, (previous PIRADS 4 lesion not seen).  He was started on tamsulosin 0.4 mg for mild obstructive symptoms.  He is feeling well with no urinary complaints.  \par \par He has chronic back pain. \par \par MRI 6/25/21-- no PIRAD 4 lesion seen PIRAD 2 lesion low risk\par PVR: 84 cc (to rule out incomplete bladder emptying) \par \par Full Hx: \par Pt is a former patient of Dr. Nelson's who has transitioned care to me.   He has a family history of prostate cancer and an elevated PSA.  He last presented to me 7/7/2020. His most recent PSA drawn at his PCP's office was 7.6.  In the past he underwent an MRI guided prostate biopsy at [facility] which was inconclusive, (6/2019)\par \par He had 1-2 episodes of nocturia per night.  He stated he "occasionally" will have to void several times an hour but this was not a regular pattern. 4K sent. \par \par Udip: negative\par \par PSA history: \par 7/20 8.18 4K 6%\par 6/20 7.6  \par 3/19 8.5   4K 33%  (4/2019)\par 6/18 7.1\par 2/18 8.2\par 5/19 8.68\par \par PMH:  hyperparathyroid, HTN, hyperchol\par PSH:  devi, cataract, parathyroidectomy \par FamHx: prostate cancer (brother)\par No tob, occasional alcohol\par \par **************\par 2/10/23;\par Patient returns for follow up.  He has a long hx of elevated PSA and is s/p 2 prior biopsies which did not show cancer.  His most recent PSA was from 11/30/22 and was 9.6. He has had 3 prior prostate MRI, most recently on 12/27/22 which showed a 4 mm PIRADs 3 lesion in the right mid PZ and a 106 cc prostate (PSAD = 0.09) He feels well and is voiding without complaint. \par \par He continues to take tamsulosin for BPH/LUTS with good effect.

## 2023-02-10 NOTE — ASSESSMENT
[FreeTextEntry1] : 71 yo male with hx of elevated PSA.  We had a lengthy conversation about his options including targeted biopsy of the PIRADs 3 lesion vs. continues surveillance.  His PSAD is normal.  He has had prior negative biopsies, and there has no progression of any concerning findings in the 3 MRIs he has had since 2019.  He would like to continue with PSA surveillance.  We will repeat a PSA today and he will follow up after that with Dr. Cardenas in the late spring.  \par \par Plan:\par 1. PSA\par 2. F/u after # 1

## 2023-02-15 ENCOUNTER — NON-APPOINTMENT (OUTPATIENT)
Age: 73
End: 2023-02-15

## 2023-02-21 LAB
PSA FREE FLD-MCNC: 17 %
PSA FREE SERPL-MCNC: 1.39 NG/ML
PSA SERPL-MCNC: 8.1 NG/ML

## 2023-02-22 ENCOUNTER — APPOINTMENT (OUTPATIENT)
Dept: MRI IMAGING | Facility: CLINIC | Age: 73
End: 2023-02-22
Payer: MEDICARE

## 2023-02-22 ENCOUNTER — OUTPATIENT (OUTPATIENT)
Dept: OUTPATIENT SERVICES | Facility: HOSPITAL | Age: 73
LOS: 1 days | End: 2023-02-22

## 2023-02-22 DIAGNOSIS — Z90.89 ACQUIRED ABSENCE OF OTHER ORGANS: Chronic | ICD-10-CM

## 2023-02-22 DIAGNOSIS — Z90.49 ACQUIRED ABSENCE OF OTHER SPECIFIED PARTS OF DIGESTIVE TRACT: Chronic | ICD-10-CM

## 2023-02-22 PROCEDURE — 70551 MRI BRAIN STEM W/O DYE: CPT | Mod: 26,MH

## 2023-02-27 ENCOUNTER — APPOINTMENT (OUTPATIENT)
Dept: FAMILY MEDICINE | Facility: CLINIC | Age: 73
End: 2023-02-27
Payer: MEDICARE

## 2023-02-27 PROCEDURE — 36415 COLL VENOUS BLD VENIPUNCTURE: CPT

## 2023-02-28 ENCOUNTER — NON-APPOINTMENT (OUTPATIENT)
Age: 73
End: 2023-02-28

## 2023-02-28 LAB
ALBUMIN SERPL ELPH-MCNC: 4 G/DL
ALP BLD-CCNC: 87 U/L
ALT SERPL-CCNC: 13 U/L
ANION GAP SERPL CALC-SCNC: 13 MMOL/L
AST SERPL-CCNC: 17 U/L
BILIRUB SERPL-MCNC: 0.7 MG/DL
BUN SERPL-MCNC: 15 MG/DL
CALCIUM SERPL-MCNC: 9 MG/DL
CHLORIDE SERPL-SCNC: 105 MMOL/L
CO2 SERPL-SCNC: 25 MMOL/L
CREAT SERPL-MCNC: 1.28 MG/DL
EGFR: 60 ML/MIN/1.73M2
GLUCOSE SERPL-MCNC: 78 MG/DL
POTASSIUM SERPL-SCNC: 4.7 MMOL/L
PROT SERPL-MCNC: 6.4 G/DL
SODIUM SERPL-SCNC: 143 MMOL/L

## 2023-03-01 LAB
ALBUMIN SERPL ELPH-MCNC: 4.1 G/DL
ALP BLD-CCNC: 91 U/L
ALT SERPL-CCNC: 13 U/L
ANION GAP SERPL CALC-SCNC: 20 MMOL/L
AST SERPL-CCNC: 17 U/L
BILIRUB SERPL-MCNC: 0.6 MG/DL
BUN SERPL-MCNC: 16 MG/DL
CALCIUM SERPL-MCNC: 9.1 MG/DL
CALCIUM SERPL-MCNC: 9.1 MG/DL
CHLORIDE SERPL-SCNC: 107 MMOL/L
CO2 SERPL-SCNC: 20 MMOL/L
CREAT SERPL-MCNC: 1.3 MG/DL
EGFR: 59 ML/MIN/1.73M2
GLUCOSE SERPL-MCNC: 80 MG/DL
PARATHYROID HORMONE INTACT: 50 PG/ML
POTASSIUM SERPL-SCNC: 4.8 MMOL/L
PROLACTIN SERPL-MCNC: 13.1 NG/ML
PROLACTIN SERPL-MCNC: 13.2 NG/ML
PROT SERPL-MCNC: 6.5 G/DL
SODIUM SERPL-SCNC: 146 MMOL/L

## 2023-03-08 ENCOUNTER — APPOINTMENT (OUTPATIENT)
Dept: NEUROSURGERY | Facility: CLINIC | Age: 73
End: 2023-03-08
Payer: MEDICARE

## 2023-03-08 ENCOUNTER — NON-APPOINTMENT (OUTPATIENT)
Age: 73
End: 2023-03-08

## 2023-03-08 ENCOUNTER — LABORATORY RESULT (OUTPATIENT)
Age: 73
End: 2023-03-08

## 2023-03-08 VITALS
WEIGHT: 190 LBS | OXYGEN SATURATION: 97 % | DIASTOLIC BLOOD PRESSURE: 77 MMHG | BODY MASS INDEX: 27.2 KG/M2 | TEMPERATURE: 98.3 F | RESPIRATION RATE: 17 BRPM | SYSTOLIC BLOOD PRESSURE: 133 MMHG | HEIGHT: 70 IN | HEART RATE: 63 BPM

## 2023-03-08 PROCEDURE — 99204 OFFICE O/P NEW MOD 45 MIN: CPT

## 2023-03-08 RX ORDER — CHOLECALCIFEROL (VITAMIN D3) 25 MCG
TABLET ORAL
Refills: 0 | Status: ACTIVE | COMMUNITY

## 2023-03-08 NOTE — DATA REVIEWED
[de-identified] : \par \par \par \par \par EXAM: [id number] - MR BRAIN - ORDERED BY: HEATHER RUDYBECKY\par \par \par PROCEDURE DATE: 02/22/2023\par \par \par \par INTERPRETATION: CLINICAL INDICATION: Memory loss.\par \par TECHNIQUE: Multi-planar multi-sequential MR imaging of the brain was performed without intravenous contrast.\par \par COMPARISON: None available.\par \par FINDINGS:\par \par There is a sellar/suprasellar mass measuring 1.3 x 1.3 x 1.6 cm in AP by transverse by CC dimensions (series 4 and 8, image 15), findings concerning for pituitary macroadenoma\par \par There are scattered T2/FLAIR hyperdense foci in the subcortical and periventricular white matter, nonspecific finding, most likely representing sequelae of very mild chronic microvascular ischemic disease.\par \par There is cortical sulcal prominence related to underlying brain parenchymal volume loss.\par \par No acute infarction, intracranial hemorrhage or intra-axial mass.\par \par There is no evidence of hydrocephalus. There are no extra-axial fluid collections. The skull base flow voids are present.\par \par The patient is status post bilateral lens placement. There are scattered mucosal inflammatory changes of the paranasal sinuses including polyp/retention cysts in the right maxillary sinus. The mastoid air cells are clear. 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 Dedicated sellar protocol MRI with and without contrast including dynamic imaging through the sella turcica is recommended for further assessment.\par \par --- End of Report ---\par \par \par \par \par

## 2023-03-08 NOTE — REVIEW OF SYSTEMS
[As Noted in HPI] : as noted in HPI [Fever] : no fever [Chills] : no chills [Seizures] : no convulsions [Dizziness] : no dizziness [Chest Pain] : no chest pain [Palpitations] : no palpitations [Shortness Of Breath] : no shortness of breath [Cough] : no cough

## 2023-03-08 NOTE — PHYSICAL EXAM
[General Appearance - Alert] : alert [General Appearance - In No Acute Distress] : in no acute distress [General Appearance - Well-Appearing] : healthy appearing [Oriented To Time, Place, And Person] : oriented to person, place, and time [Impaired Insight] : insight and judgment were intact [Affect] : the affect was normal [Memory Recent] : recent memory was not impaired [Cranial Nerves Optic (II)] : visual acuity intact bilaterally,  pupils equal round and reactive to light [Cranial Nerves Oculomotor (III)] : extraocular motion intact [Cranial Nerves Trigeminal (V)] : facial sensation intact symmetrically [Cranial Nerves Facial (VII)] : face symmetrical [Cranial Nerves Vestibulocochlear (VIII)] : hearing was intact bilaterally [Cranial Nerves Glossopharyngeal (IX)] : tongue and palate midline [Cranial Nerves Accessory (XI - Cranial And Spinal)] : head turning and shoulder shrug symmetric [Cranial Nerves Hypoglossal (XII)] : there was no tongue deviation with protrusion [Motor Handedness Right-Handed] : the patient is right hand dominant [Sclera] : the sclera and conjunctiva were normal [PERRL With Normal Accommodation] : pupils were equal in size, round, reactive to light, with normal accommodation [Extraocular Movements] : extraocular movements were intact [Neck Appearance] : the appearance of the neck was normal [] : no respiratory distress [Respiration, Rhythm And Depth] : normal respiratory rhythm and effort [Abnormal Walk] : normal gait [Skin Color & Pigmentation] : normal skin color and pigmentation

## 2023-03-08 NOTE — HISTORY OF PRESENT ILLNESS
[de-identified] : 73 y/o right- handed male, never smoker, with PMHx of HTN, parathyroid nodule removal, gallbladder removal, who presents today for evaluation of pituitary macroadenoma dx on MRI brain done 2/22/23 during workup with neurologist Dr. Stephania Mcgrath for cognitive changes. \par \par Pt endorses some forgetfulness and family history of Alzheimer's for which he sought workup with neurologist Dr. Mcgrath. 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 \par \par Denies headaches, dizziness, tunnel vision, does note double vision for about 30 years. He also notes his eyelids were drooping in the past and had them raised. \par Denies abnormal growth of hands/feet, abnormal growth of facial features, significant unexplained weight loss/gain, sudden or peripheral loss of vision, abnormal hair growth/loss, or galactorrhea.\par Has not had lab work done, scheduled to see endocrinologist Dr. Jackson later this month. \par  \par Neurologist: Dr. Stephania Mcgrath

## 2023-03-08 NOTE — ASSESSMENT
[FreeTextEntry1] : 72-year-old gentleman found to have pituitary macroadenoma on work-up for cognitive issues.  Denies visual changes.  Denies symptoms of hormonal imbalance.  Asymptomatic.  MRI with pituitary macroadenoma with primarily cystic component.  There is mass effect on the optic chiasm, but minimal compression.  Will obtain MRI of the sella and pituitary gland.  We will also refer for baseline visual fields.  We will send endocrine panel and the patient will follow-up with endocrinology later this month.  In the absence of symptoms, favor conservative management with serial imaging.\par \par Dr. D' Amico independently reviewed all available images with patient. \par \par PLAN: \par - MRI pituitary w/wo\par - Endocrine labs; apt with Dr. Jackson later this month \par - Referral to neuro-opth Dr. Douglas\par - RTC after MRI, opth exam for f/u\par \par Patient verbalizes understanding of today’s discussion and next steps in treatment plan. \par \par  \par A total of 45 minutes was spent reviewing the labs, imaging and physical examination of the patient. We discussed the diagnosis, and the plan. The patient's questions were answered. The patient demonstrated an excellent understanding of the plan.

## 2023-03-09 LAB
ANION GAP SERPL CALC-SCNC: 13 MMOL/L
BUN SERPL-MCNC: 14 MG/DL
CALCIUM SERPL-MCNC: 9.5 MG/DL
CHLORIDE SERPL-SCNC: 104 MMOL/L
CO2 SERPL-SCNC: 26 MMOL/L
CREAT SERPL-MCNC: 1.41 MG/DL
DHEA-S SERPL-MCNC: 96.7 UG/DL
EGFR: 53 ML/MIN/1.73M2
ESTRADIOL SERPL-MCNC: 24 PG/ML
FSH SERPL-MCNC: 1.6 IU/L
GH SERPL-MCNC: 0.1 NG/ML
GLUCOSE SERPL-MCNC: 96 MG/DL
IGF-1 INTERP: NORMAL
IGF-I BLD-MCNC: 65 NG/ML
LH SERPL-ACNC: 2.5 IU/L
POTASSIUM SERPL-SCNC: 4.4 MMOL/L
PROLACTIN SERPL-MCNC: 14.5 NG/ML
SODIUM SERPL-SCNC: 143 MMOL/L
T3RU NFR SERPL: 1.1 TBI
T4 FREE SERPL-MCNC: 1.1 NG/DL
T4 SERPL-MCNC: 6.7 UG/DL
TESTOST SERPL-MCNC: 416 NG/DL
TSH SERPL-ACNC: 3.64 UIU/ML

## 2023-03-11 ENCOUNTER — APPOINTMENT (OUTPATIENT)
Dept: MRI IMAGING | Facility: CLINIC | Age: 73
End: 2023-03-11
Payer: MEDICARE

## 2023-03-11 ENCOUNTER — OUTPATIENT (OUTPATIENT)
Dept: OUTPATIENT SERVICES | Facility: HOSPITAL | Age: 73
LOS: 1 days | End: 2023-03-11

## 2023-03-11 DIAGNOSIS — Z90.89 ACQUIRED ABSENCE OF OTHER ORGANS: Chronic | ICD-10-CM

## 2023-03-11 DIAGNOSIS — Z90.49 ACQUIRED ABSENCE OF OTHER SPECIFIED PARTS OF DIGESTIVE TRACT: Chronic | ICD-10-CM

## 2023-03-11 PROCEDURE — 70553 MRI BRAIN STEM W/O & W/DYE: CPT | Mod: 26,MH

## 2023-03-13 ENCOUNTER — RX RENEWAL (OUTPATIENT)
Age: 73
End: 2023-03-13

## 2023-03-13 LAB — SHBG SERPL-SCNC: 56.6 NMOL/L

## 2023-03-22 ENCOUNTER — APPOINTMENT (OUTPATIENT)
Dept: NEUROSURGERY | Facility: CLINIC | Age: 73
End: 2023-03-22
Payer: MEDICARE

## 2023-03-22 LAB
MONOMERIC PROLACTIN (ICMA)*: 13.1 NG/ML
PERCENT MACROPROLACTIN: 12 %
PROLACTIN, SERUM (ICMA)*: 14.9 NG/ML

## 2023-03-22 PROCEDURE — 99212 OFFICE O/P EST SF 10 MIN: CPT | Mod: 95

## 2023-03-22 NOTE — REVIEW OF SYSTEMS
[As Noted in HPI] : as noted in HPI [Fever] : no fever [Chills] : no chills [Eye Pain] : no eye pain [Chest Pain] : no chest pain [Palpitations] : no palpitations [Shortness Of Breath] : no shortness of breath

## 2023-03-22 NOTE — ASSESSMENT
[FreeTextEntry1] : 72-year-old gentleman found to have pituitary macroadenoma on work-up for cognitive issues.  Denies visual changes.  Denies symptoms of hormonal imbalance.  Asymptomatic.  MRI with pituitary macroadenoma with primarily cystic component likely representing prior hemorrhage.  Mass is stable on repeat MRI of the sella and pituitary gland.  There is mass effect on the optic chiasm, but minimal compression.   Awaiting visual field evaluation.  Pituitary hormones within normal limits. in the absence of symptoms, favor conservative management with serial imaging.  We will repeat MRI in 3 months.  I discussed with the patient that if the formal visual field testing identifies any deficit, he may be a candidate for surgical intervention via an endoscopic endonasal approach for resection of the pituitary tumor.  In the absence of visual deficits, favor continued conservative management with serial imaging.\par \par Dr. D' Amico independently reviewed all available images with patient. \par \par PLAN: \par - F/u with Dr. Douglas neuro-opth and endocrine as planned \par - Repeat MRI brain w/wo 3 months\par - RTC after MRI to review, sooner if visual deficit on formal opth exam \par \par Patient verbalizes understanding of today’s discussion and next steps in treatment plan. \par \par \par A total of 15 minutes was spent reviewing the labs, imaging and physical examination of the patient. We discussed the diagnosis, and the plan. The patient's questions were answered. The patient demonstrated an excellent understanding of the plan.

## 2023-03-22 NOTE — REASON FOR VISIT
[Home] : at home, [unfilled] , at the time of the visit. [Medical Office: ([facility])___] : at the medical office located in  [Patient] : the patient [Follow-Up: _____] : a [unfilled] follow-up visit [FreeTextEntry1] : pituitary macroadenoma, review MRI

## 2023-03-22 NOTE — HISTORY OF PRESENT ILLNESS
[FreeTextEntry1] : 71 y/o right- handed male, never smoker, with PMHx of HTN, parathyroid nodule removal, gallbladder removal, found to have pituitary macroadenoma dx on MRI brain done 2/22/23 during workup with neurologist Dr. Stephania Mcgrath for cognitive changes. \par \par Pt endorses some forgetfulness and family history of Alzheimer's for which he sought workup with neurologist Dr. Mcgrath. 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 \par Pt was then referred for neurosurgical evaluation. \par \par 3/8/23 Initial evaluation: \par Denies headaches, dizziness, tunnel vision, does note double vision for about 30 years. He also notes his eyelids were drooping in the past and had them raised surgically. \par Denies endo symptoms. Scheduled to see endocrinologist Dr. Jackson later this month. \par Labs ordered, referred to neuro-opth, MRI pituitary. \par \par Pituitary labs 3/8/23 WNL\par \par TODAY patient returns for follow- up and MRI review. \par He denies any changes since prior visit. He does note about 3 days ago feeling dizzy and nauseous when he awoke that resolved on his own. \par He recalls last summer he fell out of bed while sleeping, tossing and turning and rolled out of bed and hit his head. \par \par He is scheduled to see endocrinologist 3/29/23 and neuro opth Dr. Douglas 4/17/23. \par \par  \par Neurologist: Dr. Stephania Mcgrath

## 2023-03-22 NOTE — DATA REVIEWED
[de-identified] : \par \par \par \par \par EXAM: [id number] - MR BRAIN WAW IC - ORDERED BY: JANKI DAVID\par \par \par PROCEDURE DATE: 03/11/2023\par \par \par \par INTERPRETATION: PROCEDURE: MRI Sella with and without intravenous contrast\par \par INDICATION: Pituitary macroadenoma. Follow-up.\par \par TECHNIQUE: Multisequence multiplanar MR imaging of the sella and brain were obtained. Following the intravenous administration of 9cc Gadavist, additional postcontrast imaging was obtained.\par \par COMPARISON: MR brain 2/22/2023.\par \par FINDINGS:\par There is redemonstration of a 1.3 x 1.3 x 1.7 cm (AP x TR x CC) mass that seems to replaced normal pituitary gland, expanding the sella and with bilobed superior growth into the suprasellar cistern. No significant change from recent brin MR imaging. Configuration is consistent with macroadenoma with hemorrhage, or now more accurately termed as a pituitary neuroendocrine tumor (Pit-NET). The lesion demonstrates both peripheral and internal nodular enhancement, and fluid within is mixed T1 and T2 hemorrhagic signal with small hematocrit level posteriorly (fluid-fluid level seen on sagittal image 8). The pituitary stalk is deviated posteriorly and to the right which suggests left origin for tumor. In the suprasellar compartment, the optic chiasm is uplifted and thinned caliber but without edema signal; correlate for hemianopsia. There is no extension into the cavernous sinuses, which are normal in appearance bilaterally with preserved carotid flow voids.\par \par Evaluation of the brain demonstrates the ventricles, cisternal spaces and cortical sulci to be appropriate for the patient stated age. There is no midline shift or extra-axial collections. There are minimal patchy foci of T2/FLAIR hyperintensity within the subcortical and periventricular white matter, unchanged from prior exam and most consistent in appearance with sequelae of small vessel ischemic disease. The postcontrast images demonstrate no abnormal enhancement. The patient is again status post bilateral lens replacement. The visualized paranasal sinuses and mastoid air cells are well aerated.\par \par IMPRESSION:\par A 1.7 cm sellar and suprasellar mass has internal hemorrhage and nodular enhancement most consistent with pituitary NET (formerly termed as adenoma). Chiasmatic mass effect present; correlate for vision changes.\par \par --- End of Report ---\par

## 2023-03-24 LAB
CORTICOSTEROID BIND GLOBULIN: 1.2 MG/DL
CORTIS SERPL-MCNC: 4.2 UG/DL
CORTISOL, FREE: 0.32 UG/DL
PFCX: 7.6 %

## 2023-03-29 ENCOUNTER — APPOINTMENT (OUTPATIENT)
Dept: ENDOCRINOLOGY | Facility: CLINIC | Age: 73
End: 2023-03-29
Payer: MEDICARE

## 2023-03-29 ENCOUNTER — NON-APPOINTMENT (OUTPATIENT)
Age: 73
End: 2023-03-29

## 2023-03-29 VITALS
SYSTOLIC BLOOD PRESSURE: 135 MMHG | HEIGHT: 70 IN | DIASTOLIC BLOOD PRESSURE: 80 MMHG | HEART RATE: 61 BPM | WEIGHT: 190 LBS | BODY MASS INDEX: 27.2 KG/M2

## 2023-03-29 PROCEDURE — 36415 COLL VENOUS BLD VENIPUNCTURE: CPT

## 2023-03-29 PROCEDURE — 99205 OFFICE O/P NEW HI 60 MIN: CPT | Mod: 25

## 2023-03-31 NOTE — ADDENDUM
[FreeTextEntry1] : I, Lily Clark act soley as a scribe for Dr. Malcolm Jackson on this date. 03/29/2023

## 2023-03-31 NOTE — HISTORY OF PRESENT ILLNESS
[Alendronate (Fosomax)] : Alendronate [Vitamin D (oral)] : Vitamin D orally [FreeTextEntry1] : 72 year old M patient, with Hx of Osteopenia (dx ~10 years ago), without current pathological fracture,  Hx of pituitary incidentaloma/macroadenoma (found in MRI of the brain ordered for forgetfulness 02/22/23), Hx of primary hyperparathyroidism, referred by EBER CARMONA, presents today to establish endocrine care. Previous Medications for Osteoporosis: Alendronate. \par Hx of steroid use: Yes for sciatica ~ 7 years ago \par PSHx: ASK: Denies history of: fractures, Hip Surgery, Thoracic Vertebroplasty, Lumbar Vertebroplasty.  \par Denies Family History of: Osteoporosis, Hip fracture, Rheumatoid arthritis, DM, alcohol abuse\par SHx: EtOH use: 1 drink/wk. Non-smoker. . Has a 32/ yo. \par Lifestyle:Active: Walks  \par Regular dentist follow up: every 3 months\par \par Other PMHx: Hx of elevated PSA (severe BPH), HTN, borderline HLD\par PSHx: partial parathyroidectomy (L parathyroid 12/20/2019)\par Allergies: sulfa drugs\par \par 03/29/2023\par Neuro Chart Note Dr. Stephania Mcgrath 02/28/23: pituitary incidentaloma/macroadenoma found on MRI 02/22/23 (ordered for forgetfulness). Pt has neuro surgery evaluation and was recommended conservative management. \par Pituitary hormone levels work-up, all pituitary hormones are normal except cortisol: Free cortisol 0.32 Collection done at 3:22pm \par \par Pt is here for osteoporosis evaluation found during a routine visit with Dr. Blackman. \par No significant weight change, with /80 and BMI 27.26. \par As per pt he was diagnosed with osteopenia approximately 10 years ago and was treated Alendronate which he took about 5 years. Last dose was given approximately 2018. \par \par \par [Medications verified as per pt on 03/29/2023) \par Current Medications: Atorvastatin 20 mg hs, Atenolol  25 mg, Buproprion 150 mg, Doxycycline, Mulitvitamins\par Medication modified/added this visit: Prolia \par

## 2023-03-31 NOTE — DATA REVIEWED
[FreeTextEntry1] : Imaging: \par - 03/11/23 MR Pituitary: IMPRESSION: A 1.7 cm sellar and suprasellar mass has internal hemorrhage and nodular enhancement most consistent with pituitary NET (formerly termed as adenoma). Chiasmatic mass effect present; correlate for vision changes.

## 2023-03-31 NOTE — END OF VISIT
[FreeTextEntry3] : All medical record entries made by the Scribe were at my, Dr. Malcolm Jackson, direction and personally dictated by me on 03/29/2023. I have reviewed the chart and agree that the record accurately reflects my personal performance of the history, physical exam, assessment and plan. I have also personally directed, reviewed and agreed with the chart.  [Time Spent: ___ minutes] : I have spent [unfilled] minutes of time on the encounter.

## 2023-03-31 NOTE — RESULTS/DATA
[FreeTextEntry2] : 01/24/23 [de-identified] : Spine T-score: -1.5 [de-identified] : Left Hip Total T-score: -1.0 [de-identified] : Femoral Neck T-score: -2.6

## 2023-04-07 LAB
25(OH)D3 SERPL-MCNC: 41.5 NG/ML
ALBUMIN SERPL ELPH-MCNC: 4.7 G/DL
ALP BLD-CCNC: 107 U/L
ALT SERPL-CCNC: 19 U/L
ANION GAP SERPL CALC-SCNC: 19 MMOL/L
AST SERPL-CCNC: 19 U/L
BILIRUB SERPL-MCNC: 0.7 MG/DL
BUN SERPL-MCNC: 20 MG/DL
CALCIUM SERPL-MCNC: 9.8 MG/DL
CALCIUM SERPL-MCNC: 9.8 MG/DL
CHLORIDE SERPL-SCNC: 103 MMOL/L
CO2 SERPL-SCNC: 24 MMOL/L
CREAT SERPL-MCNC: 1.32 MG/DL
EGFR: 58 ML/MIN/1.73M2
GLUCOSE SERPL-MCNC: 87 MG/DL
PARATHYROID HORMONE INTACT: 40 PG/ML
POTASSIUM SERPL-SCNC: 4.3 MMOL/L
PROT SERPL-MCNC: 7.1 G/DL
SODIUM SERPL-SCNC: 145 MMOL/L

## 2023-04-14 ENCOUNTER — APPOINTMENT (OUTPATIENT)
Dept: ENDOCRINOLOGY | Facility: CLINIC | Age: 73
End: 2023-04-14
Payer: MEDICARE

## 2023-04-14 ENCOUNTER — MED ADMIN CHARGE (OUTPATIENT)
Age: 73
End: 2023-04-14

## 2023-04-14 VITALS
WEIGHT: 191 LBS | BODY MASS INDEX: 27.41 KG/M2 | DIASTOLIC BLOOD PRESSURE: 80 MMHG | SYSTOLIC BLOOD PRESSURE: 126 MMHG | HEART RATE: 55 BPM

## 2023-04-14 PROCEDURE — 96372 THER/PROPH/DIAG INJ SC/IM: CPT

## 2023-04-14 PROCEDURE — 99213 OFFICE O/P EST LOW 20 MIN: CPT | Mod: 25

## 2023-04-14 RX ORDER — DENOSUMAB 60 MG/ML
60 INJECTION SUBCUTANEOUS
Qty: 1 | Refills: 0 | Status: COMPLETED | OUTPATIENT
Start: 2023-04-14

## 2023-04-14 RX ADMIN — DENOSUMAB 0 MG/ML: 60 INJECTION SUBCUTANEOUS at 00:00

## 2023-04-14 NOTE — ASSESSMENT
[FreeTextEntry1] : Osteoporosis diagnosed ~ 10 yrs ago: Pt took alendronate for 5 yrs.  \par No history of Hip fracture. \par Recent BMD on 01/24/23 Femoral Neck T-score: -2.6\par Benefits and Side effects of prolia was extensively discussed\par pt was recommended to cotnue taking Vitamin D and Calcium supplemtntion, maintain good dental hygeine, and he is scheduled for Prolia injection today.\par  \par \par History of Hyperparathyroidism: \par Partial Parathyroidectomy (L parathyroid 12/20/2019).\par Pt is asymptomatic. 02/28/23: iPTH 50, Ca 9.1. \par \par Return in 5 months.\par \par \par     Return in: 6 months\par

## 2023-04-14 NOTE — RESULTS/DATA
[FreeTextEntry2] : 01/24/23 [de-identified] : Spine T-score: -1.5 [de-identified] : Left Hip Total T-score: -1.0 [de-identified] : Femoral Neck T-score: -2.6

## 2023-04-14 NOTE — END OF VISIT
[FreeTextEntry3] : All medical record entries made by the Scribe were at my, Dr. Malcolm Jackson, direction and personally dictated by me on 04/14/2023. I have reviewed the chart and agree that the record accurately reflects my personal performance of the history, physical exam, assessment and plan. I have also personally, directed, reviewed and agreed with the chart

## 2023-04-14 NOTE — HISTORY OF PRESENT ILLNESS
[Alendronate (Fosomax)] : Alendronate [Vitamin D (oral)] : Vitamin D orally [FreeTextEntry1] : 72 year old M patient, with Hx of Osteopenia (dx ~10 years ago), without current pathological fracture,  Hx of pituitary incidentaloma/macroadenoma (found in MRI of the brain ordered for forgetfulness 02/22/23), Hx of primary hyperparathyroidism, referred by EBER CARMONA, presents today to establish endocrine care. Previous Medications for Osteoporosis: Alendronate. \par Hx of steroid use: Yes for sciatica ~ 7 years ago \par PSHx: ASK: Denies history of: fractures, Hip Surgery, Thoracic Vertebroplasty, Lumbar Vertebroplasty.  \par Denies Family History of: Osteoporosis, Hip fracture, Rheumatoid arthritis, DM, alcohol abuse\par SHx: EtOH use: 1 drink/wk. Non-smoker. . Has a 32/ yo. \par Lifestyle:Active: Walks  \par Regular dentist follow up: every 3 months\par \par Other PMHx: Hx of elevated PSA (severe BPH), HTN, borderline HLD\par PSHx: partial parathyroidectomy (L parathyroid 12/20/2019)\par Allergies: sulfa drugs\par \par 03/29/2023\par Neuro Chart Note Dr. Stephania Mcgrath 02/28/23: pituitary incidentaloma/macroadenoma found on MRI 02/22/23 (ordered for forgetfulness). Pt has neuro surgery evaluation and was recommended conservative management. \par Pituitary hormone levels work-up, all pituitary hormones are normal except cortisol: Free cortisol 0.32 Collection done at 3:22pm \par \par Pt is here for osteoporosis evaluation found during a routine visit with Dr. Blackman. \par No significant weight change, with /80 and BMI 27.26. \par As per pt he was diagnosed with osteopenia approximately 10 years ago and was treated Alendronate which he took about 5 years. Last dose was given approximately 2018. \par \par 04/14/2023 \par CC: " I am feeling well."   \par Pt is physically active.\par Goes to dentist every 3 months. He will receive Prolia injection today. \par \par [Medications verified on 04/14/2023 as per pt] \par Current Medications: Atorvastatin 20 mg hs, Atenolol  25 mg, Buproprion 150 mg, Doxycycline, Mulitvitamins 500 mg One tablet qd\par Medication modified/added this visit: Prolia \par

## 2023-04-14 NOTE — REASON FOR VISIT
[Follow - Up] : a follow-up visit [Osteoporosis] : osteoporosis [Hyperparathyroidism] : hyperparathyroidism [Other___] : [unfilled]

## 2023-04-14 NOTE — ADDENDUM
[FreeTextEntry1] : I, Ashley Harris, acted solely as a scribe for Dr. Malcolm Jackson on this date 04/14/2023 \par \par 4/14/23 MK\par Administered 60 mg of prolia SC x1 to L arm without adverse reaction noted . \par Buy and Bill\par [employer]\par Lot# [id number]\par Exp 04/30/2025\par  Advised to call me with any concerns. \par

## 2023-04-16 NOTE — END OF VISIT
[FreeTextEntry3] : All medical record entries made by the Scribe were at my, Dr. Malcolm Jackson, direction and personally dictated by me on 04/14/2023. I have reviewed the chart and agree that the record accurately reflects my personal performance of the history, physical exam, assessment and plan. I have also personally, directed, reviewed and agreed with the chart  [Time Spent: ___ minutes] : I have spent [unfilled] minutes of time on the encounter.

## 2023-04-16 NOTE — RESULTS/DATA
[FreeTextEntry2] : 01/24/23 [de-identified] : Spine T-score: -1.5 [de-identified] : Left Hip Total T-score: -1.0 [de-identified] : Femoral Neck T-score: -2.6

## 2023-04-16 NOTE — ADDENDUM
[FreeTextEntry1] : I, Ashley Harris, acted solely as a scribe for Dr. Malcolm Jackson on this date 04/14/2023

## 2023-04-16 NOTE — ASSESSMENT
[Denosumab Therapy] : Risks  and benefits of denosumab therapy were discussed with the patient including eczema, cellulitis, osteonecrosis of the jaw and atypical femur fractures [FreeTextEntry1] : \par

## 2023-04-17 ENCOUNTER — NON-APPOINTMENT (OUTPATIENT)
Age: 73
End: 2023-04-17

## 2023-04-17 ENCOUNTER — APPOINTMENT (OUTPATIENT)
Dept: FAMILY MEDICINE | Facility: CLINIC | Age: 73
End: 2023-04-17
Payer: MEDICARE

## 2023-04-17 ENCOUNTER — APPOINTMENT (OUTPATIENT)
Dept: OPHTHALMOLOGY | Facility: CLINIC | Age: 73
End: 2023-04-17
Payer: MEDICARE

## 2023-04-17 VITALS
SYSTOLIC BLOOD PRESSURE: 131 MMHG | OXYGEN SATURATION: 96 % | HEART RATE: 57 BPM | HEIGHT: 70 IN | BODY MASS INDEX: 27.2 KG/M2 | DIASTOLIC BLOOD PRESSURE: 76 MMHG | TEMPERATURE: 97.9 F | WEIGHT: 190 LBS

## 2023-04-17 DIAGNOSIS — K21.9 GASTRO-ESOPHAGEAL REFLUX DISEASE W/OUT ESOPHAGITIS: ICD-10-CM

## 2023-04-17 PROCEDURE — 92133 CPTRZD OPH DX IMG PST SGM ON: CPT

## 2023-04-17 PROCEDURE — 92004 COMPRE OPH EXAM NEW PT 1/>: CPT

## 2023-04-17 PROCEDURE — 36415 COLL VENOUS BLD VENIPUNCTURE: CPT

## 2023-04-17 PROCEDURE — 92060 SENSORIMOTOR EXAMINATION: CPT

## 2023-04-17 PROCEDURE — 99214 OFFICE O/P EST MOD 30 MIN: CPT | Mod: 25

## 2023-04-17 PROCEDURE — 92083 EXTENDED VISUAL FIELD XM: CPT

## 2023-04-17 RX ORDER — BUPROPION HYDROCHLORIDE 150 MG/1
150 TABLET, FILM COATED, EXTENDED RELEASE ORAL
Qty: 90 | Refills: 0 | Status: DISCONTINUED | COMMUNITY
End: 2023-04-17

## 2023-04-17 NOTE — HISTORY OF PRESENT ILLNESS
[FreeTextEntry1] : repeat blood work -- chol check \par bp check  [de-identified] : 73 yo m presents for repeat blood work today. 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 \par Has been exercising and doing PT weekly. Has been eating better as well. \par Back pain has been getting better.

## 2023-04-17 NOTE — PLAN
[FreeTextEntry1] : mood \par stable \par cont meds \par \par cholesterol \par cont meds \par reviewed labs with patient \par encouraged low fat diet and exercise \par follow up labs, labs drawn in office \par \par bp \par stable \par cont meds\par encouraged low salt diet and exercise

## 2023-04-18 ENCOUNTER — TRANSCRIPTION ENCOUNTER (OUTPATIENT)
Age: 73
End: 2023-04-18

## 2023-04-18 ENCOUNTER — NON-APPOINTMENT (OUTPATIENT)
Age: 73
End: 2023-04-18

## 2023-04-18 LAB
ALBUMIN SERPL ELPH-MCNC: 4.3 G/DL
ALP BLD-CCNC: 91 U/L
ALT SERPL-CCNC: 20 U/L
ANION GAP SERPL CALC-SCNC: 12 MMOL/L
AST SERPL-CCNC: 17 U/L
BILIRUB SERPL-MCNC: 0.6 MG/DL
BUN SERPL-MCNC: 16 MG/DL
CALCIUM SERPL-MCNC: 9 MG/DL
CHLORIDE SERPL-SCNC: 106 MMOL/L
CHOLEST SERPL-MCNC: 127 MG/DL
CO2 SERPL-SCNC: 23 MMOL/L
CREAT SERPL-MCNC: 1.13 MG/DL
EGFR: 69 ML/MIN/1.73M2
ESTIMATED AVERAGE GLUCOSE: 103 MG/DL
GLUCOSE SERPL-MCNC: 90 MG/DL
HBA1C MFR BLD HPLC: 5.2 %
HDLC SERPL-MCNC: 44 MG/DL
LDLC SERPL CALC-MCNC: 56 MG/DL
NONHDLC SERPL-MCNC: 83 MG/DL
POTASSIUM SERPL-SCNC: 4.3 MMOL/L
PROT SERPL-MCNC: 6.3 G/DL
SODIUM SERPL-SCNC: 141 MMOL/L
TRIGL SERPL-MCNC: 134 MG/DL

## 2023-04-19 ENCOUNTER — TRANSCRIPTION ENCOUNTER (OUTPATIENT)
Age: 73
End: 2023-04-19

## 2023-05-24 ENCOUNTER — NON-APPOINTMENT (OUTPATIENT)
Age: 73
End: 2023-05-24

## 2023-06-09 ENCOUNTER — RX RENEWAL (OUTPATIENT)
Age: 73
End: 2023-06-09

## 2023-06-12 ENCOUNTER — RESULT REVIEW (OUTPATIENT)
Age: 73
End: 2023-06-12

## 2023-07-19 ENCOUNTER — RX RENEWAL (OUTPATIENT)
Age: 73
End: 2023-07-19

## 2023-08-10 ENCOUNTER — TRANSCRIPTION ENCOUNTER (OUTPATIENT)
Age: 73
End: 2023-08-10

## 2023-08-28 ENCOUNTER — NON-APPOINTMENT (OUTPATIENT)
Age: 73
End: 2023-08-28

## 2023-08-29 ENCOUNTER — APPOINTMENT (OUTPATIENT)
Dept: ENDOCRINOLOGY | Facility: CLINIC | Age: 73
End: 2023-08-29
Payer: MEDICARE

## 2023-08-29 VITALS
DIASTOLIC BLOOD PRESSURE: 80 MMHG | WEIGHT: 191 LBS | SYSTOLIC BLOOD PRESSURE: 136 MMHG | HEART RATE: 60 BPM | BODY MASS INDEX: 27.41 KG/M2

## 2023-08-29 DIAGNOSIS — M81.0 AGE-RELATED OSTEOPOROSIS W/OUT CURRENT PATHOLOGICAL FRACTURE: ICD-10-CM

## 2023-08-29 DIAGNOSIS — D35.1 BENIGN NEOPLASM OF PARATHYROID GLAND: ICD-10-CM

## 2023-08-29 PROCEDURE — 99215 OFFICE O/P EST HI 40 MIN: CPT

## 2023-08-29 NOTE — HISTORY OF PRESENT ILLNESS
[Alendronate (Fosomax)] : Alendronate [Vitamin D (oral)] : Vitamin D orally [FreeTextEntry1] : 73 year old M patient, with Hx of Osteopenia (dx ~10 years ago), without current pathological fracture,  Hx of pituitary incidentaloma/macroadenoma (found in MRI of the brain ordered for forgetfulness 02/22/23), Hx of primary hyperparathyroidism, referred by EBER CARMONA, presents today to establish endocrine care. Previous Medications for Osteoporosis: Alendronate.  Hx of steroid use: Yes for sciatica ~ 7 years ago  PSHx: ASK: Denies history of: fractures, Hip Surgery, Thoracic Vertebroplasty, Lumbar Vertebroplasty.   Denies Family History of: Osteoporosis, Hip fracture, Rheumatoid arthritis, DM, alcohol abuse SHx: EtOH use: 1 drink/wk. Non-smoker. . Has a 32/ yo.  Lifestyle:Active: Walks   Regular dentist follow up: every 3 months  Other PMHx: Hx of elevated PSA (severe BPH), HTN, borderline HLD PSHx: partial parathyroidectomy (L parathyroid 12/20/2019) Allergies: sulfa drugs  03/29/2023 Neuro Chart Note Dr. Stephania Mcgrath 02/28/23: pituitary incidentaloma/macroadenoma found on MRI 02/22/23 (ordered for forgetfulness). Pt has neuro surgery evaluation and was recommended conservative management.  Pituitary hormone levels work-up, all pituitary hormones are normal except cortisol: Free cortisol 0.32 Collection done at 3:22pm   Pt is here for osteoporosis evaluation found during a routine visit with Dr. Blackman.  No significant weight change, with /80 and BMI 27.26.  As per pt he was diagnosed with osteopenia approximately 10 years ago and was treated Alendronate which he took about 5 years. Last dose was given approximately 2018.   04/14/2023  CC: " I am feeling well."    Pt is physically active. Goes to dentist every 3 months. He will receive Prolia injection today.    08/29/2023 CC: "  I am feeling fine. " Pt denies any complaints. Denies HAs, and visual disturbances. Pt has seen a neuropthnmalogist recently to check his visual fields. He is due for next Prolia injection next month. Consistent with Vitamin D and Calcium.   [Medications verified on 08/29/2023 as per pt]  Current Medications: Prolia Injection (Initiated ~ 04/14/23) Atorvastatin 20 mg hs, Atenolol  25 mg, Buproprion 150 mg, Doxycycline, Mulitvitamins 500 mg One tablet qd

## 2023-08-29 NOTE — RESULTS/DATA
[FreeTextEntry2] : 01/24/23 [de-identified] : Spine T-score: -1.5 [de-identified] : Left Hip Total T-score: -1.0 [de-identified] : Femoral Neck T-score: -2.6

## 2023-08-29 NOTE — ASSESSMENT
[Denosumab Therapy] : Risks  and benefits of denosumab therapy were discussed with the patient including eczema, cellulitis, osteonecrosis of the jaw and atypical femur fractures

## 2023-08-29 NOTE — ADDENDUM
[FreeTextEntry1] : I, Ashley Harris, acted solely as a scribe for Dr. Malcolm Jackson on this date 08/29/2023

## 2023-08-29 NOTE — END OF VISIT
[FreeTextEntry3] : All medical record entries made by the Scribe were at my, Dr. Malcolm Jackson, direction and personally dictated by me on 08/29/2023. I have reviewed the chart and agree that the record accurately reflects my personal performance of the history, physical exam, assessment and plan. I have also personally, directed, reviewed and agreed with the chart

## 2023-08-30 ENCOUNTER — APPOINTMENT (OUTPATIENT)
Dept: NEUROLOGY | Facility: CLINIC | Age: 73
End: 2023-08-30

## 2023-09-14 ENCOUNTER — RX RENEWAL (OUTPATIENT)
Age: 73
End: 2023-09-14

## 2023-09-18 ENCOUNTER — APPOINTMENT (OUTPATIENT)
Dept: FAMILY MEDICINE | Facility: CLINIC | Age: 73
End: 2023-09-18
Payer: MEDICARE

## 2023-09-18 VITALS
BODY MASS INDEX: 28.35 KG/M2 | TEMPERATURE: 98 F | HEIGHT: 70 IN | SYSTOLIC BLOOD PRESSURE: 147 MMHG | WEIGHT: 198 LBS | OXYGEN SATURATION: 9 % | DIASTOLIC BLOOD PRESSURE: 83 MMHG | HEART RATE: 55 BPM

## 2023-09-18 VITALS — SYSTOLIC BLOOD PRESSURE: 124 MMHG | DIASTOLIC BLOOD PRESSURE: 82 MMHG

## 2023-09-18 PROCEDURE — G0008: CPT

## 2023-09-18 PROCEDURE — 90662 IIV NO PRSV INCREASED AG IM: CPT

## 2023-09-18 PROCEDURE — 99214 OFFICE O/P EST MOD 30 MIN: CPT | Mod: 25

## 2023-09-18 RX ORDER — TAMSULOSIN HYDROCHLORIDE 0.4 MG/1
0.4 CAPSULE ORAL
Qty: 30 | Refills: 5 | Status: DISCONTINUED | COMMUNITY
Start: 2021-06-25 | End: 2023-09-18

## 2023-09-25 ENCOUNTER — APPOINTMENT (OUTPATIENT)
Dept: ENDOCRINOLOGY | Facility: CLINIC | Age: 73
End: 2023-09-25

## 2023-09-26 ENCOUNTER — TRANSCRIPTION ENCOUNTER (OUTPATIENT)
Age: 73
End: 2023-09-26

## 2023-09-30 ENCOUNTER — OUTPATIENT (OUTPATIENT)
Dept: OUTPATIENT SERVICES | Facility: HOSPITAL | Age: 73
LOS: 1 days | End: 2023-09-30

## 2023-09-30 ENCOUNTER — APPOINTMENT (OUTPATIENT)
Dept: MRI IMAGING | Facility: CLINIC | Age: 73
End: 2023-09-30
Payer: MEDICARE

## 2023-09-30 DIAGNOSIS — Z90.89 ACQUIRED ABSENCE OF OTHER ORGANS: Chronic | ICD-10-CM

## 2023-09-30 DIAGNOSIS — Z90.49 ACQUIRED ABSENCE OF OTHER SPECIFIED PARTS OF DIGESTIVE TRACT: Chronic | ICD-10-CM

## 2023-09-30 PROCEDURE — 70553 MRI BRAIN STEM W/O & W/DYE: CPT | Mod: 26,MH

## 2023-10-04 PROBLEM — D35.2 PITUITARY MACROADENOMA: Status: ACTIVE | Noted: 2023-02-28

## 2023-10-09 ENCOUNTER — APPOINTMENT (OUTPATIENT)
Dept: OPHTHALMOLOGY | Facility: CLINIC | Age: 73
End: 2023-10-09
Payer: MEDICARE

## 2023-10-09 ENCOUNTER — RX RENEWAL (OUTPATIENT)
Age: 73
End: 2023-10-09

## 2023-10-09 ENCOUNTER — NON-APPOINTMENT (OUTPATIENT)
Age: 73
End: 2023-10-09

## 2023-10-09 PROCEDURE — 92083 EXTENDED VISUAL FIELD XM: CPT

## 2023-10-09 PROCEDURE — 92133 CPTRZD OPH DX IMG PST SGM ON: CPT

## 2023-10-09 PROCEDURE — 92014 COMPRE OPH EXAM EST PT 1/>: CPT

## 2023-10-10 ENCOUNTER — APPOINTMENT (OUTPATIENT)
Dept: NEUROLOGY | Facility: CLINIC | Age: 73
End: 2023-10-10

## 2023-10-11 ENCOUNTER — APPOINTMENT (OUTPATIENT)
Dept: NEUROSURGERY | Facility: CLINIC | Age: 73
End: 2023-10-11
Payer: MEDICARE

## 2023-10-11 DIAGNOSIS — D35.2 BENIGN NEOPLASM OF PITUITARY GLAND: ICD-10-CM

## 2023-10-11 PROCEDURE — 99212 OFFICE O/P EST SF 10 MIN: CPT | Mod: 95

## 2023-10-13 LAB
ALBUMIN SERPL ELPH-MCNC: 4.2 G/DL
ALP BLD-CCNC: 107 U/L
ALT SERPL-CCNC: 20 U/L
ANION GAP SERPL CALC-SCNC: 10 MMOL/L
AST SERPL-CCNC: 19 U/L
BILIRUB SERPL-MCNC: 0.5 MG/DL
BUN SERPL-MCNC: 16 MG/DL
CALCIUM SERPL-MCNC: 9.6 MG/DL
CHLORIDE SERPL-SCNC: 106 MMOL/L
CHOLEST SERPL-MCNC: 124 MG/DL
CO2 SERPL-SCNC: 27 MMOL/L
CREAT SERPL-MCNC: 1.27 MG/DL
EGFR: 60 ML/MIN/1.73M2
ESTIMATED AVERAGE GLUCOSE: 103 MG/DL
GLUCOSE SERPL-MCNC: 98 MG/DL
HBA1C MFR BLD HPLC: 5.2 %
HDLC SERPL-MCNC: 44 MG/DL
LDLC SERPL CALC-MCNC: 61 MG/DL
NONHDLC SERPL-MCNC: 80 MG/DL
POTASSIUM SERPL-SCNC: 4.5 MMOL/L
PROT SERPL-MCNC: 6.6 G/DL
SODIUM SERPL-SCNC: 144 MMOL/L
TRIGL SERPL-MCNC: 98 MG/DL

## 2023-10-18 ENCOUNTER — APPOINTMENT (OUTPATIENT)
Dept: ENDOCRINOLOGY | Facility: CLINIC | Age: 73
End: 2023-10-18
Payer: MEDICARE

## 2023-10-18 PROCEDURE — 96372 THER/PROPH/DIAG INJ SC/IM: CPT

## 2023-10-18 RX ADMIN — DENOSUMAB 0 MG/ML: 60 INJECTION SUBCUTANEOUS at 00:00

## 2023-10-23 ENCOUNTER — APPOINTMENT (OUTPATIENT)
Dept: HEART AND VASCULAR | Facility: CLINIC | Age: 73
End: 2023-10-23

## 2023-12-23 LAB
25(OH)D3 SERPL-MCNC: 35.7 NG/ML
CALCIUM SERPL-MCNC: 9.7 MG/DL
PARATHYROID HORMONE INTACT: 62 PG/ML
PROLACTIN SERPL-MCNC: 17.9 NG/ML

## 2023-12-27 ENCOUNTER — NON-APPOINTMENT (OUTPATIENT)
Age: 73
End: 2023-12-27

## 2024-02-06 NOTE — COUNSELING
[Behavioral health counseling provided] : Behavioral health counseling provided anterior abdomen  incisional hernia abdominal incisional hernia

## 2024-03-07 ENCOUNTER — APPOINTMENT (OUTPATIENT)
Dept: FAMILY MEDICINE | Facility: CLINIC | Age: 74
End: 2024-03-07
Payer: MEDICARE

## 2024-03-07 VITALS
DIASTOLIC BLOOD PRESSURE: 71 MMHG | BODY MASS INDEX: 27.06 KG/M2 | TEMPERATURE: 97.3 F | HEIGHT: 70 IN | SYSTOLIC BLOOD PRESSURE: 111 MMHG | WEIGHT: 189 LBS | HEART RATE: 56 BPM | OXYGEN SATURATION: 96 %

## 2024-03-07 DIAGNOSIS — M54.9 DORSALGIA, UNSPECIFIED: ICD-10-CM

## 2024-03-07 DIAGNOSIS — G89.29 DORSALGIA, UNSPECIFIED: ICD-10-CM

## 2024-03-07 DIAGNOSIS — E21.3 HYPERPARATHYROIDISM, UNSPECIFIED: ICD-10-CM

## 2024-03-07 DIAGNOSIS — R97.20 ELEVATED PROSTATE, SPECIFIC ANTIGEN [PSA]: ICD-10-CM

## 2024-03-07 DIAGNOSIS — E78.5 HYPERLIPIDEMIA, UNSPECIFIED: ICD-10-CM

## 2024-03-07 PROCEDURE — 99214 OFFICE O/P EST MOD 30 MIN: CPT

## 2024-03-07 PROCEDURE — 36415 COLL VENOUS BLD VENIPUNCTURE: CPT

## 2024-03-07 PROCEDURE — G2211 COMPLEX E/M VISIT ADD ON: CPT

## 2024-03-07 RX ORDER — OMEPRAZOLE MAGNESIUM 20 MG/1
20 CAPSULE, DELAYED RELEASE ORAL
Refills: 0 | Status: ACTIVE | COMMUNITY

## 2024-03-07 RX ORDER — BUPROPION HYDROCHLORIDE 150 MG/1
150 TABLET, EXTENDED RELEASE ORAL DAILY
Qty: 90 | Refills: 0 | Status: ACTIVE | COMMUNITY
Start: 2023-04-17

## 2024-03-07 NOTE — PLAN
[FreeTextEntry1] : mood  stable  cont meds   cholesterol  cont meds  reviewed labs with patient  encouraged low fat diet and exercise  follow up labs   bp  stable  cont meds encouraged low salt diet and exercise  pending appt next month for prolia inj, scan with neurosx

## 2024-03-07 NOTE — HISTORY OF PRESENT ILLNESS
[FreeTextEntry1] : psa follow up cholesterol follow up  [de-identified] : 74 yo m presents to follow up

## 2024-03-07 NOTE — HEALTH RISK ASSESSMENT
[PHQ-2 Negative - No further assessment needed] : PHQ-2 Negative - No further assessment needed [0] : 2) Feeling down, depressed, or hopeless: Not at all (0) [[id number]] : 0 [Never] : Never

## 2024-03-08 ENCOUNTER — RX RENEWAL (OUTPATIENT)
Age: 74
End: 2024-03-08

## 2024-03-08 RX ORDER — ATORVASTATIN CALCIUM 20 MG/1
20 TABLET, FILM COATED ORAL
Qty: 90 | Refills: 1 | Status: ACTIVE | COMMUNITY
Start: 1900-01-01 | End: 1900-01-01

## 2024-03-13 DIAGNOSIS — Z00.00 ENCOUNTER FOR GENERAL ADULT MEDICAL EXAMINATION W/OUT ABNORMAL FINDINGS: ICD-10-CM

## 2024-03-13 LAB
ALBUMIN SERPL ELPH-MCNC: 4.4 G/DL
ALP BLD-CCNC: 87 U/L
ALT SERPL-CCNC: 17 U/L
ANION GAP SERPL CALC-SCNC: 13 MMOL/L
AST SERPL-CCNC: 23 U/L
BASOPHILS # BLD AUTO: 0.06 K/UL
BASOPHILS NFR BLD AUTO: 0.9 %
BILIRUB SERPL-MCNC: 0.8 MG/DL
BUN SERPL-MCNC: 18 MG/DL
CALCIUM SERPL-MCNC: 8.9 MG/DL
CHLORIDE SERPL-SCNC: 105 MMOL/L
CHOLEST SERPL-MCNC: 134 MG/DL
CO2 SERPL-SCNC: 24 MMOL/L
CREAT SERPL-MCNC: 1.37 MG/DL
EGFR: 55 ML/MIN/1.73M2
EOSINOPHIL # BLD AUTO: 0.17 K/UL
EOSINOPHIL NFR BLD AUTO: 2.5 %
ESTIMATED AVERAGE GLUCOSE: 108 MG/DL
GLUCOSE SERPL-MCNC: 78 MG/DL
HBA1C MFR BLD HPLC: 5.4 %
HCT VFR BLD CALC: 47.5 %
HDLC SERPL-MCNC: 48 MG/DL
HGB BLD-MCNC: 15.5 G/DL
IMM GRANULOCYTES NFR BLD AUTO: 0.3 %
LDLC SERPL CALC-MCNC: 73 MG/DL
LYMPHOCYTES # BLD AUTO: 2.12 K/UL
LYMPHOCYTES NFR BLD AUTO: 31.6 %
MAN DIFF?: NORMAL
MCHC RBC-ENTMCNC: 28.9 PG
MCHC RBC-ENTMCNC: 32.6 GM/DL
MCV RBC AUTO: 88.6 FL
MONOCYTES # BLD AUTO: 0.39 K/UL
MONOCYTES NFR BLD AUTO: 5.8 %
NEUTROPHILS # BLD AUTO: 3.95 K/UL
NEUTROPHILS NFR BLD AUTO: 58.9 %
NONHDLC SERPL-MCNC: 86 MG/DL
PLATELET # BLD AUTO: 202 K/UL
POTASSIUM SERPL-SCNC: 4.3 MMOL/L
PROT SERPL-MCNC: 7 G/DL
PSA SERPL-MCNC: 9.55 NG/ML
RBC # BLD: 5.36 M/UL
RBC # FLD: 13.2 %
SODIUM SERPL-SCNC: 141 MMOL/L
TRIGL SERPL-MCNC: 63 MG/DL
TSH SERPL-ACNC: 2.58 UIU/ML
WBC # FLD AUTO: 6.71 K/UL

## 2024-03-14 ENCOUNTER — APPOINTMENT (OUTPATIENT)
Dept: FAMILY MEDICINE | Facility: CLINIC | Age: 74
End: 2024-03-14

## 2024-03-28 ENCOUNTER — APPOINTMENT (OUTPATIENT)
Dept: FAMILY MEDICINE | Facility: CLINIC | Age: 74
End: 2024-03-28

## 2024-04-03 ENCOUNTER — RX RENEWAL (OUTPATIENT)
Age: 74
End: 2024-04-03

## 2024-04-03 RX ORDER — TAMSULOSIN HYDROCHLORIDE 0.4 MG/1
0.4 CAPSULE ORAL
Qty: 30 | Refills: 0 | Status: ACTIVE | COMMUNITY
Start: 2022-06-01 | End: 1900-01-01

## 2024-04-16 ENCOUNTER — APPOINTMENT (OUTPATIENT)
Dept: UROLOGY | Facility: CLINIC | Age: 74
End: 2024-04-16

## 2024-04-17 ENCOUNTER — APPOINTMENT (OUTPATIENT)
Dept: HEART AND VASCULAR | Facility: CLINIC | Age: 74
End: 2024-04-17

## 2024-05-21 LAB
ANION GAP SERPL CALC-SCNC: 13 MMOL/L
BUN SERPL-MCNC: 16 MG/DL
CALCIUM SERPL-MCNC: 9.1 MG/DL
CHLORIDE SERPL-SCNC: 105 MMOL/L
CO2 SERPL-SCNC: 22 MMOL/L
CREAT SERPL-MCNC: 1.19 MG/DL
EGFR: 65 ML/MIN/1.73M2
GLUCOSE SERPL-MCNC: 93 MG/DL
POTASSIUM SERPL-SCNC: 4 MMOL/L
SODIUM SERPL-SCNC: 141 MMOL/L

## 2024-06-04 ENCOUNTER — RX RENEWAL (OUTPATIENT)
Age: 74
End: 2024-06-04

## 2024-06-04 RX ORDER — ATENOLOL 25 MG/1
25 TABLET ORAL
Qty: 90 | Refills: 0 | Status: ACTIVE | COMMUNITY
Start: 2022-12-15 | End: 1900-01-01

## 2024-07-01 ENCOUNTER — NON-APPOINTMENT (OUTPATIENT)
Age: 74
End: 2024-07-01

## 2024-07-01 ENCOUNTER — APPOINTMENT (OUTPATIENT)
Dept: HEART AND VASCULAR | Facility: CLINIC | Age: 74
End: 2024-07-01
Payer: MEDICARE

## 2024-07-01 VITALS
DIASTOLIC BLOOD PRESSURE: 77 MMHG | WEIGHT: 189.56 LBS | BODY MASS INDEX: 27.14 KG/M2 | OXYGEN SATURATION: 97 % | SYSTOLIC BLOOD PRESSURE: 115 MMHG | HEIGHT: 70 IN | HEART RATE: 60 BPM | TEMPERATURE: 98 F

## 2024-07-01 DIAGNOSIS — I10 ESSENTIAL (PRIMARY) HYPERTENSION: ICD-10-CM

## 2024-07-01 DIAGNOSIS — R68.89 OTHER GENERAL SYMPTOMS AND SIGNS: ICD-10-CM

## 2024-07-01 PROCEDURE — 99214 OFFICE O/P EST MOD 30 MIN: CPT

## 2024-07-01 PROCEDURE — G2211 COMPLEX E/M VISIT ADD ON: CPT

## 2024-07-01 PROCEDURE — 93000 ELECTROCARDIOGRAM COMPLETE: CPT

## 2024-07-04 ENCOUNTER — NON-APPOINTMENT (OUTPATIENT)
Age: 74
End: 2024-07-04

## 2024-07-10 ENCOUNTER — TRANSCRIPTION ENCOUNTER (OUTPATIENT)
Age: 74
End: 2024-07-10

## 2024-07-10 ENCOUNTER — RX RENEWAL (OUTPATIENT)
Age: 74
End: 2024-07-10

## 2024-07-25 ENCOUNTER — APPOINTMENT (OUTPATIENT)
Dept: FAMILY MEDICINE | Facility: CLINIC | Age: 74
End: 2024-07-25
Payer: MEDICARE

## 2024-07-25 VITALS
HEART RATE: 50 BPM | TEMPERATURE: 97.9 F | BODY MASS INDEX: 26.77 KG/M2 | SYSTOLIC BLOOD PRESSURE: 132 MMHG | HEIGHT: 70 IN | WEIGHT: 187 LBS | DIASTOLIC BLOOD PRESSURE: 76 MMHG | OXYGEN SATURATION: 95 %

## 2024-07-25 DIAGNOSIS — K21.9 GASTRO-ESOPHAGEAL REFLUX DISEASE W/OUT ESOPHAGITIS: ICD-10-CM

## 2024-07-25 DIAGNOSIS — M54.9 DORSALGIA, UNSPECIFIED: ICD-10-CM

## 2024-07-25 DIAGNOSIS — M47.819 SPONDYLOSIS W/OUT MYELOPATHY OR RADICULOPATHY, SITE UNSPECIFIED: ICD-10-CM

## 2024-07-25 DIAGNOSIS — M54.50 LOW BACK PAIN, UNSPECIFIED: ICD-10-CM

## 2024-07-25 DIAGNOSIS — R97.20 ELEVATED PROSTATE, SPECIFIC ANTIGEN [PSA]: ICD-10-CM

## 2024-07-25 DIAGNOSIS — Z23 ENCOUNTER FOR IMMUNIZATION: ICD-10-CM

## 2024-07-25 DIAGNOSIS — R68.89 OTHER GENERAL SYMPTOMS AND SIGNS: ICD-10-CM

## 2024-07-25 DIAGNOSIS — G89.29 LOW BACK PAIN, UNSPECIFIED: ICD-10-CM

## 2024-07-25 DIAGNOSIS — R41.89 OTHER SYMPTOMS AND SIGNS INVOLVING COGNITIVE FUNCTIONS AND AWARENESS: ICD-10-CM

## 2024-07-25 DIAGNOSIS — N13.8 BENIGN PROSTATIC HYPERPLASIA WITH LOWER URINARY TRACT SYMPMS: ICD-10-CM

## 2024-07-25 DIAGNOSIS — I10 ESSENTIAL (PRIMARY) HYPERTENSION: ICD-10-CM

## 2024-07-25 DIAGNOSIS — Z87.39 PERSONAL HISTORY OF OTHER DISEASES OF THE MUSCULOSKELETAL SYSTEM AND CONNECTIVE TISSUE: ICD-10-CM

## 2024-07-25 DIAGNOSIS — F32.A DEPRESSION, UNSPECIFIED: ICD-10-CM

## 2024-07-25 DIAGNOSIS — M25.69 STIFFNESS OF OTHER SPECIFIED JOINT, NOT ELSEWHERE CLASSIFIED: ICD-10-CM

## 2024-07-25 DIAGNOSIS — M81.0 AGE-RELATED OSTEOPOROSIS W/OUT CURRENT PATHOLOGICAL FRACTURE: ICD-10-CM

## 2024-07-25 DIAGNOSIS — Z86.69 PERSONAL HISTORY OF OTHER DISEASES OF THE NERVOUS SYSTEM AND SENSE ORGANS: ICD-10-CM

## 2024-07-25 DIAGNOSIS — Z00.00 ENCOUNTER FOR GENERAL ADULT MEDICAL EXAMINATION W/OUT ABNORMAL FINDINGS: ICD-10-CM

## 2024-07-25 DIAGNOSIS — N40.1 BENIGN PROSTATIC HYPERPLASIA WITH LOWER URINARY TRACT SYMPMS: ICD-10-CM

## 2024-07-25 DIAGNOSIS — G89.29 PERSONAL HISTORY OF OTHER DISEASES OF THE MUSCULOSKELETAL SYSTEM AND CONNECTIVE TISSUE: ICD-10-CM

## 2024-07-25 DIAGNOSIS — E21.3 HYPERPARATHYROIDISM, UNSPECIFIED: ICD-10-CM

## 2024-07-25 DIAGNOSIS — E78.5 HYPERLIPIDEMIA, UNSPECIFIED: ICD-10-CM

## 2024-07-25 PROCEDURE — G0439: CPT

## 2024-07-25 PROCEDURE — 36415 COLL VENOUS BLD VENIPUNCTURE: CPT

## 2024-07-25 NOTE — HEALTH RISK ASSESSMENT
[Good] : ~his/her~  mood as  good [Yes] : Yes [Monthly or less (1 pt)] : Monthly or less (1 point) [1 or 2 (0 pts)] : 1 or 2 (0 points) [Never (0 pts)] : Never (0 points) [No] : In the past 12 months have you used drugs other than those required for medical reasons? No [0] : 2) Feeling down, depressed, or hopeless: Not at all (0) [PHQ-2 Negative - No further assessment needed] : PHQ-2 Negative - No further assessment needed [Audit-CScore] : 1 [de-identified] : limited  [de-identified] : fruits, veggies  [[id number]] : 0 [Never] : Never [Patient declined colonoscopy] : Patient declined colonoscopy [HIV test declined] : HIV test declined [Hepatitis C test declined] : Hepatitis C test declined [Change in mental status noted] : No change in mental status noted [Language] : denies difficulty with language [None] : None [With Significant Other] : lives with significant other [] :  [Feels Safe at Home] : Feels safe at home [Fully functional (bathing, dressing, toileting, transferring, walking, feeding)] : Fully functional (bathing, dressing, toileting, transferring, walking, feeding) [Fully functional (using the telephone, shopping, preparing meals, housekeeping, doing laundry, using] : Fully functional and needs no help or supervision to perform IADLs (using the telephone, shopping, preparing meals, housekeeping, doing laundry, using transportation, managing medications and managing finances) [Reports changes in hearing] : Reports no changes in hearing [Reports changes in vision] : Reports no changes in vision [ColonoscopyComments] : pending

## 2024-07-26 LAB
ALBUMIN SERPL ELPH-MCNC: 4.3 G/DL
ALP BLD-CCNC: 97 U/L
ALT SERPL-CCNC: 16 U/L
ANION GAP SERPL CALC-SCNC: 14 MMOL/L
AST SERPL-CCNC: 17 U/L
BASOPHILS # BLD AUTO: 0.07 K/UL
BASOPHILS NFR BLD AUTO: 1 %
BILIRUB SERPL-MCNC: 0.8 MG/DL
BUN SERPL-MCNC: 13 MG/DL
CALCIUM SERPL-MCNC: 9.5 MG/DL
CHLORIDE SERPL-SCNC: 103 MMOL/L
CHOLEST SERPL-MCNC: 145 MG/DL
CO2 SERPL-SCNC: 25 MMOL/L
CREAT SERPL-MCNC: 1.27 MG/DL
EGFR: 60 ML/MIN/1.73M2
EOSINOPHIL # BLD AUTO: 0.22 K/UL
EOSINOPHIL NFR BLD AUTO: 3.2 %
ESTIMATED AVERAGE GLUCOSE: 108 MG/DL
GLUCOSE SERPL-MCNC: 84 MG/DL
HBA1C MFR BLD HPLC: 5.4 %
HCT VFR BLD CALC: 48 %
HDLC SERPL-MCNC: 48 MG/DL
HGB BLD-MCNC: 15 G/DL
IMM GRANULOCYTES NFR BLD AUTO: 0.3 %
LDLC SERPL CALC-MCNC: 79 MG/DL
LYMPHOCYTES # BLD AUTO: 1.85 K/UL
LYMPHOCYTES NFR BLD AUTO: 26.8 %
MAN DIFF?: NORMAL
MCHC RBC-ENTMCNC: 28.5 PG
MCHC RBC-ENTMCNC: 31.3 GM/DL
MCV RBC AUTO: 91.1 FL
MONOCYTES # BLD AUTO: 0.45 K/UL
MONOCYTES NFR BLD AUTO: 6.5 %
NEUTROPHILS # BLD AUTO: 4.3 K/UL
NEUTROPHILS NFR BLD AUTO: 62.2 %
NONHDLC SERPL-MCNC: 98 MG/DL
PLATELET # BLD AUTO: 235 K/UL
POTASSIUM SERPL-SCNC: 4.5 MMOL/L
PROT SERPL-MCNC: 6.6 G/DL
PSA SERPL-MCNC: 11.9 NG/ML
RBC # BLD: 5.27 M/UL
RBC # FLD: 14.5 %
SODIUM SERPL-SCNC: 142 MMOL/L
TRIGL SERPL-MCNC: 100 MG/DL
TSH SERPL-ACNC: 3.1 UIU/ML
WBC # FLD AUTO: 6.91 K/UL

## 2024-07-30 ENCOUNTER — APPOINTMENT (OUTPATIENT)
Dept: UROLOGY | Facility: CLINIC | Age: 74
End: 2024-07-30
Payer: MEDICARE

## 2024-07-30 PROCEDURE — 99214 OFFICE O/P EST MOD 30 MIN: CPT

## 2024-07-31 NOTE — HISTORY OF PRESENT ILLNESS
[FreeTextEntry1] : 07/30/2024 -- 74 M with a family hx of prostate CA, and an elevated PSA level. Here today for annual f/u- recent PSA 7/25/24-- 11.90 ng/mL drawn by PCP.   PSA: 7/25/24-- 11.90 ng/mL   1/18/23-- Pt is a 73 yo M with a family hx of prostate CA, and an elevated PSA level - Pt's PSA level from 11/30/22 was 9.61 ng/mL, up from 9.37ng/mL on 9/8/22. MRI from 6/25/2021 showed a PIRAD 2 lesion, but did not show a previously observed PIRAD 4 lesion. Pt underwent a prostate MRI on 12/27/22 and presents today for a telehealth visit to discuss MRI results.   Pt has undergone two prostate biopsies - both biopsies inconclusive, (see below).   Prostate MRI: 12/27/22-- very severe BPH. prostate volume is increased from 85 cc up to 106 cc. In the R posterior peripheral zone at the level of the mid gland there is a new tiny 4mm focus of restricted diffusion with no definitive abnormality on the T2 sequences and only linear enhancement on the postcontrast imaging most likely representing localized minimal prostatitis. since this is a new finding follow-up in a year is recommended (PI-RADS 3).  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  - diverticulosis. no diverticulitis or bowel obstruction.  - new fat containing 3.7 x 2.3 x 3.9cm R femoral hernia.   12/12/22-- Pt is a 73 yo M with family hx of prostate CA and an elevated PSA - PSA from 9/8/2022 was 9.37ng/mL, elevated from 8.47 on 6/10/22. MRI from 6/25/2021 showed no PIRAD 4 lesion; he does have a PIRADs 2 lesion. Two prior biopsies at [facility] were inconclusive. Pt takes Tamsulosin 0/4mg daily. He presents today for a telephonic visit to discuss PSA results from 11/30/22 - PSA was 9.61 ng/mL.  11/30/22-- Pt is a 73 yo M with family hx of prostate CA and an elevated PSA - PSA from 9/8/2022 was 9.37 ng/mL, elevated from 8.47 on 6/10/22. MRI from 6/25/21 showed no PIRAD 4 lesion; he does have a PIRADS 2 lesion. He presents today for a follow-up visit and reports he feels well.    Pt continues to take Tamsulosin 0.4mg daily, and states that his urinary stream is strong.   PSA hx: 7/24/2020: 8.18 ng/mL 5/19/2021: 8.68 ng/mL 12/14/2021: 7.71 ng/mL 6/10/2022: 8.47 ng/mL  9/8/2022: 9.37 ng/mL   Udip: negative PVR: 11 cc (to rule out incomplete bladder emptying)  12/14/2021-- 71 year old male with family history of prostate cancer and elevated PSA presenting for annual wellness exam.He was last seen by Dr. Quintana 6/25/21 and underwent a repeat MRI which was unremarkable for high grade lesion, (previous PIRADS 4 lesion not seen).  He was started on tamsulosin 0.4 mg for mild obstructive symptoms.  He is feeling well with no urinary complaints.    He has chronic back pain.   MRI 6/25/21-- no PIRAD 4 lesion seen PIRAD 2 lesion low risk PVR: 84 cc (to rule out incomplete bladder emptying)   Full Hx:  Pt is a former patient of Dr. Nelson's who has transitioned care to me.   He has a family history of prostate cancer and an elevated PSA.  He last presented to me 7/7/2020. His most recent PSA drawn at his PCP's office was 7.6.  In the past he underwent an MRI guided prostate biopsy at [facility] which was inconclusive, (6/2019)  He had 1-2 episodes of nocturia per night.  He stated he "occasionally" will have to void several times an hour but this was not a regular pattern. 4K sent.   Udip: negative  PSA history:  7/20 8.18 4K 6% 6/20 7.6   3/19 8.5   4K 33%  (4/2019) 6/18 7.1 2/18 8.2 5/19 8.68  PMH:  hyperparathyroid, HTN, hyperchol PSH:  devi, cataract, parathyroidectomy  FamHx: prostate cancer (brother) No tob, occasional alcohol

## 2024-07-31 NOTE — ASSESSMENT
[FreeTextEntry1] : I discussed the findings and options with . KRYSTYNA HASSAN in detail.   The etiology and implications of elevated PSA were discussed in great detail with Mr. HASSAN . Reviewed recent PSA: 7/25/24-- 11.90 ng/mL.    Based on his PSA elevations, I recommended that he proceed with an MRI of the prostate and described the procedure to him. He understands that the risk of a clinically significant prostate cancer is correlated (though not absolutely) with the PI-RADS score assigned by the radiologist.  - Prostate MRI ordered today, and the appropriate referral/requisition was provided.   Patient will f/u with Dr. Quintana to discuss prostate MRI results and next steps. Patient expressed understanding.  The total amount of time I have personally spent preparing for this visit, reviewing the patient's test results, obtaining external history, ordering tests/medications, documenting clinical information, communicating with and counseling the patient/family and/or caregiver(s), reviewing old records, and spent face to face with the patient explaining the above was 35 minutes.

## 2024-07-31 NOTE — HISTORY OF PRESENT ILLNESS
[FreeTextEntry1] : 07/30/2024 -- 74 M with a family hx of prostate CA, and an elevated PSA level. Here today for annual f/u- recent PSA 7/25/24-- 11.90 ng/mL drawn by PCP.   PSA: 7/25/24-- 11.90 ng/mL   1/18/23-- Pt is a 73 yo M with a family hx of prostate CA, and an elevated PSA level - Pt's PSA level from 11/30/22 was 9.61 ng/mL, up from 9.37ng/mL on 9/8/22. MRI from 6/25/2021 showed a PIRAD 2 lesion, but did not show a previously observed PIRAD 4 lesion. Pt underwent a prostate MRI on 12/27/22 and presents today for a telehealth visit to discuss MRI results.   Pt has undergone two prostate biopsies - both biopsies inconclusive, (see below).   Prostate MRI: 12/27/22-- very severe BPH. prostate volume is increased from 85 cc up to 106 cc. In the R posterior peripheral zone at the level of the mid gland there is a new tiny 4mm focus of restricted diffusion with no definitive abnormality on the T2 sequences and only linear enhancement on the postcontrast imaging most likely representing localized minimal prostatitis. since this is a new finding follow-up in a year is recommended (PI-RADS 3).  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  - diverticulosis. no diverticulitis or bowel obstruction.  - new fat containing 3.7 x 2.3 x 3.9cm R femoral hernia.   12/12/22-- Pt is a 73 yo M with family hx of prostate CA and an elevated PSA - PSA from 9/8/2022 was 9.37ng/mL, elevated from 8.47 on 6/10/22. MRI from 6/25/2021 showed no PIRAD 4 lesion; he does have a PIRADs 2 lesion. Two prior biopsies at [facility] were inconclusive. Pt takes Tamsulosin 0/4mg daily. He presents today for a telephonic visit to discuss PSA results from 11/30/22 - PSA was 9.61 ng/mL.  11/30/22-- Pt is a 73 yo M with family hx of prostate CA and an elevated PSA - PSA from 9/8/2022 was 9.37 ng/mL, elevated from 8.47 on 6/10/22. MRI from 6/25/21 showed no PIRAD 4 lesion; he does have a PIRADS 2 lesion. He presents today for a follow-up visit and reports he feels well.    Pt continues to take Tamsulosin 0.4mg daily, and states that his urinary stream is strong.   PSA hx: 7/24/2020: 8.18 ng/mL 5/19/2021: 8.68 ng/mL 12/14/2021: 7.71 ng/mL 6/10/2022: 8.47 ng/mL  9/8/2022: 9.37 ng/mL   Udip: negative PVR: 11 cc (to rule out incomplete bladder emptying)  12/14/2021-- 71 year old male with family history of prostate cancer and elevated PSA presenting for annual wellness exam.He was last seen by Dr. Quintana 6/25/21 and underwent a repeat MRI which was unremarkable for high grade lesion, (previous PIRADS 4 lesion not seen).  He was started on tamsulosin 0.4 mg for mild obstructive symptoms.  He is feeling well with no urinary complaints.    He has chronic back pain.   MRI 6/25/21-- no PIRAD 4 lesion seen PIRAD 2 lesion low risk PVR: 84 cc (to rule out incomplete bladder emptying)   Full Hx:  Pt is a former patient of Dr. Nelson's who has transitioned care to me.   He has a family history of prostate cancer and an elevated PSA.  He last presented to me 7/7/2020. His most recent PSA drawn at his PCP's office was 7.6.  In the past he underwent an MRI guided prostate biopsy at [facility] which was inconclusive, (6/2019)  He had 1-2 episodes of nocturia per night.  He stated he "occasionally" will have to void several times an hour but this was not a regular pattern. 4K sent.   Udip: negative  PSA history:  7/20 8.18 4K 6% 6/20 7.6   3/19 8.5   4K 33%  (4/2019) 6/18 7.1 2/18 8.2 5/19 8.68  PMH:  hyperparathyroid, HTN, hyperchol PSH:  devi, cataract, parathyroidectomy  FamHx: prostate cancer (brother) No tob, occasional alcohol

## 2024-07-31 NOTE — ADDENDUM
[FreeTextEntry1] : A portion of this note was written by [Noam Diehl] on 07/30/2024 acting as a scribe for Dr. Cardenas.   I have personally reviewed the chart and agree that the record accurately reflects my personal performance of the history, physical exam, assessment, and plan.

## 2024-07-31 NOTE — PHYSICAL EXAM
[de-identified] : palpable nodule on the left base of the prostate.
[de-identified] : palpable nodule on the left base of the prostate.
no

## 2024-08-19 ENCOUNTER — NON-APPOINTMENT (OUTPATIENT)
Age: 74
End: 2024-08-19

## 2024-08-28 ENCOUNTER — APPOINTMENT (OUTPATIENT)
Dept: ENDOCRINOLOGY | Facility: CLINIC | Age: 74
End: 2024-08-28
Payer: MEDICARE

## 2024-08-28 VITALS
WEIGHT: 190 LBS | DIASTOLIC BLOOD PRESSURE: 68 MMHG | HEART RATE: 63 BPM | SYSTOLIC BLOOD PRESSURE: 107 MMHG | BODY MASS INDEX: 27.26 KG/M2

## 2024-08-28 DIAGNOSIS — D35.2 BENIGN NEOPLASM OF PITUITARY GLAND: ICD-10-CM

## 2024-08-28 DIAGNOSIS — M81.0 AGE-RELATED OSTEOPOROSIS W/OUT CURRENT PATHOLOGICAL FRACTURE: ICD-10-CM

## 2024-08-28 DIAGNOSIS — D35.1 BENIGN NEOPLASM OF PARATHYROID GLAND: ICD-10-CM

## 2024-08-28 LAB
25(OH)D3 SERPL-MCNC: 32.1 NG/ML
ALBUMIN SERPL ELPH-MCNC: 4.2 G/DL
ALP BLD-CCNC: 92 U/L
ALT SERPL-CCNC: 22 U/L
ANION GAP SERPL CALC-SCNC: 12 MMOL/L
AST SERPL-CCNC: 20 U/L
BILIRUB SERPL-MCNC: 0.6 MG/DL
BUN SERPL-MCNC: 15 MG/DL
CALCIUM SERPL-MCNC: 8.9 MG/DL
CALCIUM SERPL-MCNC: 8.9 MG/DL
CHLORIDE SERPL-SCNC: 105 MMOL/L
CO2 SERPL-SCNC: 24 MMOL/L
CREAT SERPL-MCNC: 1.15 MG/DL
EGFR: 67 ML/MIN/1.73M2
FSH SERPL-MCNC: 1.4 IU/L
GLUCOSE SERPL-MCNC: 96 MG/DL
PARATHYROID HORMONE INTACT: 45 PG/ML
POTASSIUM SERPL-SCNC: 4.7 MMOL/L
PROLACTIN SERPL-MCNC: 16 NG/ML
PROT SERPL-MCNC: 6.4 G/DL
SODIUM SERPL-SCNC: 142 MMOL/L
TSH SERPL-ACNC: 4.06 UIU/ML

## 2024-08-28 PROCEDURE — 99214 OFFICE O/P EST MOD 30 MIN: CPT

## 2024-08-29 ENCOUNTER — TRANSCRIPTION ENCOUNTER (OUTPATIENT)
Age: 74
End: 2024-08-29

## 2024-09-03 ENCOUNTER — OUTPATIENT (OUTPATIENT)
Dept: OUTPATIENT SERVICES | Facility: HOSPITAL | Age: 74
LOS: 1 days | End: 2024-09-03

## 2024-09-03 ENCOUNTER — APPOINTMENT (OUTPATIENT)
Dept: RADIOLOGY | Facility: HOSPITAL | Age: 74
End: 2024-09-03
Payer: MEDICARE

## 2024-09-03 ENCOUNTER — RESULT REVIEW (OUTPATIENT)
Age: 74
End: 2024-09-03

## 2024-09-03 DIAGNOSIS — Z90.49 ACQUIRED ABSENCE OF OTHER SPECIFIED PARTS OF DIGESTIVE TRACT: Chronic | ICD-10-CM

## 2024-09-03 DIAGNOSIS — Z90.89 ACQUIRED ABSENCE OF OTHER ORGANS: Chronic | ICD-10-CM

## 2024-09-03 PROCEDURE — 77085 DXA BONE DENSITY AXL VRT FX: CPT

## 2024-09-03 PROCEDURE — 77085 DXA BONE DENSITY AXL VRT FX: CPT | Mod: 26

## 2024-09-03 NOTE — RESULTS/DATA
[FreeTextEntry2] : 01/24/23 [de-identified] : Spine T-score: -1.5 [de-identified] : Left Hip Total T-score: -1.0 [de-identified] : Femoral Neck T-score: -2.6

## 2024-09-03 NOTE — HISTORY OF PRESENT ILLNESS
[Alendronate (Fosomax)] : Alendronate [Vitamin D (oral)] : Vitamin D orally [FreeTextEntry1] : 74 year old M patient, with Hx of Osteopenia (dx 2014), without current pathological fracture,  Hx of pituitary incidentaloma/macroadenoma (found in MRI of the brain ordered for forgetfulness 02/22/23), Hx of primary hyperparathyroidism, referred by EBER CARMONA, presents today to establish endocrine care. Previous Medications for Osteoporosis: Alendronate.  Hx of steroid use: Yes for sciatica ~ 7 years ago  PSHx: ASK: Denies history of: fractures, Hip Surgery, Thoracic Vertebroplasty, Lumbar Vertebroplasty.   Denies Family History of: Osteoporosis, Hip fracture, Rheumatoid arthritis, DM, alcohol abuse SHx: EtOH use: 1 drink/wk. Non-smoker. . Has a 32/ yo.  Lifestyle: Active: Walks   Regular dentist follow up: every 3 months  Other PMHx: Hx of elevated PSA (severe BPH), HTN, borderline HLD PSHx: partial parathyroidectomy (L parathyroid 12/20/2019) Allergies: sulfa drugs  03/29/2023 Neuro Chart Note Dr. Stephania Mcgrath 02/28/23: pituitary incidentaloma/macroadenoma found on MRI 02/22/23 (ordered for forgetfulness). Pt has neuro surgery evaluation and was recommended conservative management.  Pituitary hormone levels work-up, all pituitary hormones are normal except cortisol: Free cortisol 0.32 Collection done at 3:22pm   Pt is here for osteoporosis evaluation found during a routine visit with Dr. Blackman.  No significant weight change, with /80 and BMI 27.26.  As per pt he was diagnosed with osteopenia approximately 10 years ago and was treated Alendronate which he took about 5 years. Last dose was given approximately 2018.   04/14/2023  CC: " I am feeling well."    Pt is physically active. Goes to dentist every 3 months. He will receive Prolia injection today.    08/29/2023 CC: "  I am feeling fine. " Pt denies any complaints. Denies HAs, and visual disturbances. Pt has seen a neuropthnmalogist recently to check his visual fields. He is due for next Prolia injection next month. Consistent with Vitamin D and Calcium.   08/28/2024  Pt has /68 and BMI 27.26. No significant weight change.  Osteopenia dx 2014. Treated with Alendronate for 5 years, last dose 2018. First dose of Prolia 04/14/23, then 10/12/23. None after. Pt with hx of pituitary macroadenoma, hx of parathyroid surgery 12/20/19. CC: "I feel good and I'm doing well." Pt denies physical complaints and recently completed lab work. He appears forgetful. No information about missing Prolia injection in 04/2024.   - 08/23/24 PTH 45, calcium 8.9, prolactin 16, TSH 4.0, s. creat 1.1  [Medications verified as per pt on 08/30/2024] Current Medications: Prolia Injection (Initiated ~ 04/14/23) Atorvastatin 20 mg hs, Atenolol  25 mg, Buproprion 150 mg, Doxycycline, Multivitamins 500 mg One tablet qd

## 2024-09-03 NOTE — END OF VISIT
[FreeTextEntry3] :  All medical record entries made by the Scribe were at my, Dr. Malcolm Jackson, direction and personally dictated by me on 08/28/2024. I have reviewed the chart and agree that the record accurately reflects my personal performance of the history, physical exam, assessment and plan. I have also personally directed, reviewed and agreed with the chart. [Time Spent: ___ minutes] : I have spent [unfilled] minutes of time on the encounter which excludes teaching and separately reported services.

## 2024-09-03 NOTE — RESULTS/DATA
[FreeTextEntry2] : 01/24/23 [de-identified] : Spine T-score: -1.5 [de-identified] : Left Hip Total T-score: -1.0 [de-identified] : Femoral Neck T-score: -2.6

## 2024-09-03 NOTE — RESULTS/DATA
[FreeTextEntry2] : 01/24/23 [de-identified] : Spine T-score: -1.5 [de-identified] : Left Hip Total T-score: -1.0 [de-identified] : Femoral Neck T-score: -2.6

## 2024-09-03 NOTE — PHYSICAL EXAM
[No Acute Distress] : no acute distress [Normal Sclera/Conjunctiva] : normal sclera/conjunctiva [No Proptosis] : no proptosis [Normal Oropharynx] : the oropharynx was normal [Thyroid Not Enlarged] : the thyroid was not enlarged [No Thyroid Nodules] : no palpable thyroid nodules [Clear to Auscultation] : lungs were clear to auscultation bilaterally [Normal Rate] : heart rate was normal [No Edema] : no peripheral edema [Pedal Pulses Normal] : the pedal pulses are present [Soft] : abdomen soft [Spine Straight] : spine straight [No Stigmata of Cushings Syndrome] : no stigmata of Cushings Syndrome [Normal Gait] : normal gait [Normal Strength/Tone] : muscle strength and tone were normal [No Rash] : no rash [Normal Reflexes] : deep tendon reflexes were 2+ and symmetric [No Tremors] : no tremors [Oriented x3] : oriented to person, place, and time [Normal Outer Ear/Nose] : the ears and nose were normal in appearance [Kyphosis] : no kyphosis present [Acanthosis Nigricans] : no acanthosis nigricans

## 2024-09-03 NOTE — ADDENDUM
[FreeTextEntry1] : I, Kobe You act solely as a scribe for Dr. Malcolm Jackson on this date 08/28/2024

## 2024-09-05 ENCOUNTER — TRANSCRIPTION ENCOUNTER (OUTPATIENT)
Age: 74
End: 2024-09-05

## 2024-09-07 ENCOUNTER — TRANSCRIPTION ENCOUNTER (OUTPATIENT)
Age: 74
End: 2024-09-07

## 2024-09-17 ENCOUNTER — APPOINTMENT (OUTPATIENT)
Dept: ENDOCRINOLOGY | Facility: CLINIC | Age: 74
End: 2024-09-17

## 2024-09-17 PROCEDURE — 96372 THER/PROPH/DIAG INJ SC/IM: CPT

## 2024-09-18 ENCOUNTER — TRANSCRIPTION ENCOUNTER (OUTPATIENT)
Age: 74
End: 2024-09-18

## 2024-09-20 ENCOUNTER — APPOINTMENT (OUTPATIENT)
Dept: UROLOGY | Facility: CLINIC | Age: 74
End: 2024-09-20
Payer: MEDICARE

## 2024-09-20 DIAGNOSIS — N13.8 BENIGN PROSTATIC HYPERPLASIA WITH LOWER URINARY TRACT SYMPMS: ICD-10-CM

## 2024-09-20 DIAGNOSIS — N40.1 BENIGN PROSTATIC HYPERPLASIA WITH LOWER URINARY TRACT SYMPMS: ICD-10-CM

## 2024-09-20 DIAGNOSIS — R97.20 ELEVATED PROSTATE, SPECIFIC ANTIGEN [PSA]: ICD-10-CM

## 2024-09-20 PROCEDURE — G2211 COMPLEX E/M VISIT ADD ON: CPT

## 2024-09-20 PROCEDURE — 99214 OFFICE O/P EST MOD 30 MIN: CPT

## 2024-09-22 NOTE — PHYSICAL EXAM
[Normal Appearance] : normal appearance [General Appearance - In No Acute Distress] : no acute distress

## 2024-09-22 NOTE — ASSESSMENT
[FreeTextEntry1] : 75 yo male with long-standing hx of elevated PSA with multiple negative biopsies.  His recent MRI shows a stable PIRADs 3 lesion.  His prostate is quite large at 154 cc which is an almost 50% increase in size since his MRI in 2022.  This alone would explain a rise in PSA.  We discussed the option of proceeding with TP biopsy of the PIARDs 3 lesion vs. continuing with PSA surveillance.  He prefers to hold off on any additional biopsies at this time.

## 2024-09-22 NOTE — HISTORY OF PRESENT ILLNESS
[Home] : at home, [unfilled] , at the time of the visit. [Medical Office: ([facility])___] : at the medical office located in  [Verbal consent obtained from patient] : the patient, [unfilled] [FreeTextEntry1] : 1/18/23: [Dr. Cardenas Previous note] Pt is a 71 yo M with a family hx of prostate CA, and an elevated PSA level - Pt's PSA level from 11/30/22 was 9.61 ng/mL, up from 9.37ng/mL on 9/8/22. MRI from 6/25/2021 showed a PIRAD 2 lesion, but did not show a previously observed PIRAD 4 lesion. Pt underwent a prostate MRI on 12/27/22 and presents today for a telehealth visit to discuss MRI results.   Pt has undergone two prostate biopsies - both biopsies inconclusive, (see below).   Prostate MRI: 12/27/22-- very severe BPH. prostate volume is increased from 85 cc up to 106 cc. In the R posterior peripheral zone at the level of the mid gland there is a new tiny 4mm focus of restricted diffusion with no definitive abnormality on the T2 sequences and only linear enhancement on the postcontrast imaging most likely representing localized minimal prostatitis. since this is a new finding follow-up in a year is recommended (PI-RADS 3).  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  - diverticulosis. no diverticulitis or bowel obstruction.  - new fat containing 3.7 x 2.3 x 3.9cm R femoral hernia.   12/12/22-- Pt is a 71 yo M with family hx of prostate CA and an elevated PSA - PSA from 9/8/2022 was 9.37ng/mL, elevated from 8.47 on 6/10/22. MRI from 6/25/2021 showed no PIRAD 4 lesion; he does have a PIRADs 2 lesion. Two prior biopsies at [facility] were inconclusive. Pt takes Tamsulosin 0/4mg daily. He presents today for a telephonic visit to discuss PSA results from 11/30/22 - PSA was 9.61 ng/mL.  11/30/22-- Pt is a 71 yo M with family hx of prostate CA and an elevated PSA - PSA from 9/8/2022 was 9.37 ng/mL, elevated from 8.47 on 6/10/22. MRI from 6/25/21 showed no PIRAD 4 lesion; he does have a PIRADS 2 lesion. He presents today for a follow-up visit and reports he feels well.    Pt continues to take Tamsulosin 0.4mg daily, and states that his urinary stream is strong.   PSA hx: 7/24/2020: 8.18 ng/mL 5/19/2021: 8.68 ng/mL 12/14/2021: 7.71 ng/mL 6/10/2022: 8.47 ng/mL  9/8/2022: 9.37 ng/mL   Udip: negative PVR: 11 cc (to rule out incomplete bladder emptying)  12/14/2021-- 71 year old male with family history of prostate cancer and elevated PSA presenting for annual wellness exam.He was last seen by Dr. Quintana 6/25/21 and underwent a repeat MRI which was unremarkable for high grade lesion, (previous PIRADS 4 lesion not seen).  He was started on tamsulosin 0.4 mg for mild obstructive symptoms.  He is feeling well with no urinary complaints.    He has chronic back pain.   MRI 6/25/21-- no PIRAD 4 lesion seen PIRAD 2 lesion low risk PVR: 84 cc (to rule out incomplete bladder emptying)   Full Hx:  Pt is a former patient of Dr. Nelson's who has transitioned care to me.   He has a family history of prostate cancer and an elevated PSA.  He last presented to me 7/7/2020. His most recent PSA drawn at his PCP's office was 7.6.  In the past he underwent an MRI guided prostate biopsy at [facility] which was inconclusive, (6/2019)  He had 1-2 episodes of nocturia per night.  He stated he "occasionally" will have to void several times an hour but this was not a regular pattern. 4K sent.   Udip: negative  PSA history:  7/20 8.18 4K 6% 6/20 7.6   3/19 8.5   4K 33%  (4/2019) 6/18 7.1 2/18 8.2 5/19 8.68  PMH:  hyperparathyroid, HTN, hyperchol PSH:  devi, cataract, parathyroidectomy  FamHx: prostate cancer (brother) No tob, occasional alcohol  ************** 2/10/23; Patient returns for follow up.  He has a long hx of elevated PSA and is s/p 2 prior biopsies which did not show cancer.  His most recent PSA was from 11/30/22 and was 9.6. He has had 3 prior prostate MRI, most recently on 12/27/22 which showed a 4 mm PIRADs 3 lesion in the right mid PZ and a 106 cc prostate (PSAD = 0.09) He feels well and is voiding without complaint.   He continues to take tamsulosin for BPH/LUTS with good effect.   ************ 9/20/24: Patient returns for follow up for long standing hx of elevated PSA.  He has undergone numerous biopsies including MR guided biopsies.  He has always had benign findings.  His most recent PSA from 7/25/24 was 11.9. He typically ranges between 8 and 9.  He underwent an MRI on 9/3/24 which showed that his prostate has grown to 154 cc (PSAD = 0.7) with a stable PIRADs 3 lesion seen on MRI in 2022.    He voids relatively well on tamsulosin.

## 2024-10-02 ENCOUNTER — TRANSCRIPTION ENCOUNTER (OUTPATIENT)
Age: 74
End: 2024-10-02

## 2024-10-07 ENCOUNTER — RX RENEWAL (OUTPATIENT)
Age: 74
End: 2024-10-07

## 2025-02-15 DIAGNOSIS — M81.0 AGE-RELATED OSTEOPOROSIS W/OUT CURRENT PATHOLOGICAL FRACTURE: ICD-10-CM

## 2025-02-15 DIAGNOSIS — D35.2 BENIGN NEOPLASM OF PITUITARY GLAND: ICD-10-CM

## 2025-02-25 ENCOUNTER — APPOINTMENT (OUTPATIENT)
Dept: ENDOCRINOLOGY | Facility: CLINIC | Age: 75
End: 2025-02-25
Payer: MEDICARE

## 2025-02-25 VITALS
BODY MASS INDEX: 28.06 KG/M2 | HEIGHT: 70 IN | WEIGHT: 196 LBS | SYSTOLIC BLOOD PRESSURE: 131 MMHG | DIASTOLIC BLOOD PRESSURE: 74 MMHG | HEART RATE: 59 BPM

## 2025-02-25 DIAGNOSIS — D35.2 BENIGN NEOPLASM OF PITUITARY GLAND: ICD-10-CM

## 2025-02-25 DIAGNOSIS — M81.0 AGE-RELATED OSTEOPOROSIS W/OUT CURRENT PATHOLOGICAL FRACTURE: ICD-10-CM

## 2025-02-25 PROCEDURE — 99215 OFFICE O/P EST HI 40 MIN: CPT

## 2025-02-25 PROCEDURE — G2211 COMPLEX E/M VISIT ADD ON: CPT

## 2025-02-25 RX ORDER — CALCIUM CITRATE/VITAMIN D3 315MG-6.25
TABLET ORAL
Refills: 0 | Status: ACTIVE | COMMUNITY

## 2025-02-25 RX ORDER — MULTIVITAMIN
TABLET ORAL
Refills: 0 | Status: ACTIVE | COMMUNITY

## 2025-02-25 RX ORDER — CETIRIZINE HCL 10 MG
TABLET ORAL
Refills: 0 | Status: ACTIVE | COMMUNITY

## 2025-02-27 ENCOUNTER — TRANSCRIPTION ENCOUNTER (OUTPATIENT)
Age: 75
End: 2025-02-27

## 2025-03-01 ENCOUNTER — OUTPATIENT (OUTPATIENT)
Dept: OUTPATIENT SERVICES | Facility: HOSPITAL | Age: 75
LOS: 1 days | End: 2025-03-01

## 2025-03-01 ENCOUNTER — APPOINTMENT (OUTPATIENT)
Dept: RADIOLOGY | Facility: CLINIC | Age: 75
End: 2025-03-01
Payer: MEDICARE

## 2025-03-01 DIAGNOSIS — Z90.89 ACQUIRED ABSENCE OF OTHER ORGANS: Chronic | ICD-10-CM

## 2025-03-01 DIAGNOSIS — Z90.49 ACQUIRED ABSENCE OF OTHER SPECIFIED PARTS OF DIGESTIVE TRACT: Chronic | ICD-10-CM

## 2025-03-01 PROCEDURE — 72070 X-RAY EXAM THORAC SPINE 2VWS: CPT | Mod: 26

## 2025-03-03 ENCOUNTER — TRANSCRIPTION ENCOUNTER (OUTPATIENT)
Age: 75
End: 2025-03-03

## 2025-03-17 ENCOUNTER — OUTPATIENT (OUTPATIENT)
Dept: OUTPATIENT SERVICES | Facility: HOSPITAL | Age: 75
LOS: 1 days | End: 2025-03-17
Payer: MEDICARE

## 2025-03-17 ENCOUNTER — APPOINTMENT (OUTPATIENT)
Dept: INFUSION THERAPY | Facility: CLINIC | Age: 75
End: 2025-03-17

## 2025-03-17 VITALS
HEIGHT: 70 IN | DIASTOLIC BLOOD PRESSURE: 83 MMHG | WEIGHT: 190.92 LBS | OXYGEN SATURATION: 96 % | HEART RATE: 61 BPM | TEMPERATURE: 98 F | RESPIRATION RATE: 18 BRPM | SYSTOLIC BLOOD PRESSURE: 142 MMHG

## 2025-03-17 DIAGNOSIS — M81.0 AGE-RELATED OSTEOPOROSIS WITHOUT CURRENT PATHOLOGICAL FRACTURE: ICD-10-CM

## 2025-03-17 DIAGNOSIS — Z90.89 ACQUIRED ABSENCE OF OTHER ORGANS: Chronic | ICD-10-CM

## 2025-03-17 DIAGNOSIS — Z90.49 ACQUIRED ABSENCE OF OTHER SPECIFIED PARTS OF DIGESTIVE TRACT: Chronic | ICD-10-CM

## 2025-03-17 PROCEDURE — 96372 THER/PROPH/DIAG INJ SC/IM: CPT

## 2025-03-17 RX ORDER — DENOSUMAB 60 MG/ML
60 INJECTION SUBCUTANEOUS ONCE
Refills: 0 | Status: COMPLETED | OUTPATIENT
Start: 2025-03-17 | End: 2025-03-17

## 2025-03-17 RX ADMIN — DENOSUMAB 60 MILLIGRAM(S): 60 INJECTION SUBCUTANEOUS at 17:45

## 2025-03-26 ENCOUNTER — NON-APPOINTMENT (OUTPATIENT)
Age: 75
End: 2025-03-26

## 2025-03-26 ENCOUNTER — APPOINTMENT (OUTPATIENT)
Dept: OPHTHALMOLOGY | Facility: CLINIC | Age: 75
End: 2025-03-26
Payer: MEDICARE

## 2025-03-26 PROCEDURE — 92133 CPTRZD OPH DX IMG PST SGM ON: CPT

## 2025-03-26 PROCEDURE — 92060 SENSORIMOTOR EXAMINATION: CPT

## 2025-03-26 PROCEDURE — 92083 EXTENDED VISUAL FIELD XM: CPT

## 2025-03-26 PROCEDURE — 92014 COMPRE OPH EXAM EST PT 1/>: CPT

## 2025-04-07 ENCOUNTER — RX RENEWAL (OUTPATIENT)
Age: 75
End: 2025-04-07

## 2025-07-28 ENCOUNTER — APPOINTMENT (OUTPATIENT)
Dept: FAMILY MEDICINE | Facility: CLINIC | Age: 75
End: 2025-07-28
Payer: MEDICARE

## 2025-07-28 ENCOUNTER — NON-APPOINTMENT (OUTPATIENT)
Age: 75
End: 2025-07-28

## 2025-07-28 VITALS
SYSTOLIC BLOOD PRESSURE: 119 MMHG | HEART RATE: 52 BPM | BODY MASS INDEX: 27.2 KG/M2 | OXYGEN SATURATION: 97 % | WEIGHT: 190 LBS | DIASTOLIC BLOOD PRESSURE: 75 MMHG | HEIGHT: 70 IN | TEMPERATURE: 98.3 F

## 2025-07-28 DIAGNOSIS — K21.9 GASTRO-ESOPHAGEAL REFLUX DISEASE W/OUT ESOPHAGITIS: ICD-10-CM

## 2025-07-28 DIAGNOSIS — R97.20 ELEVATED PROSTATE, SPECIFIC ANTIGEN [PSA]: ICD-10-CM

## 2025-07-28 DIAGNOSIS — F32.A DEPRESSION, UNSPECIFIED: ICD-10-CM

## 2025-07-28 DIAGNOSIS — Z00.00 ENCOUNTER FOR GENERAL ADULT MEDICAL EXAMINATION W/OUT ABNORMAL FINDINGS: ICD-10-CM

## 2025-07-28 DIAGNOSIS — E21.3 HYPERPARATHYROIDISM, UNSPECIFIED: ICD-10-CM

## 2025-07-28 PROCEDURE — G0439: CPT

## 2025-07-28 PROCEDURE — 36415 COLL VENOUS BLD VENIPUNCTURE: CPT

## 2025-07-31 LAB
ALBUMIN SERPL ELPH-MCNC: 4.3 G/DL
ALP BLD-CCNC: 91 U/L
ALT SERPL-CCNC: 18 U/L
ANION GAP SERPL CALC-SCNC: 15 MMOL/L
AST SERPL-CCNC: 23 U/L
BASOPHILS # BLD AUTO: 0.07 K/UL
BASOPHILS NFR BLD AUTO: 1.2 %
BILIRUB SERPL-MCNC: 0.8 MG/DL
BUN SERPL-MCNC: 17 MG/DL
CALCIUM SERPL-MCNC: 8.8 MG/DL
CHLORIDE SERPL-SCNC: 106 MMOL/L
CHOLEST SERPL-MCNC: 122 MG/DL
CO2 SERPL-SCNC: 22 MMOL/L
CREAT SERPL-MCNC: 1.21 MG/DL
EGFRCR SERPLBLD CKD-EPI 2021: 63 ML/MIN/1.73M2
EOSINOPHIL # BLD AUTO: 0.2 K/UL
EOSINOPHIL NFR BLD AUTO: 3.3 %
ESTIMATED AVERAGE GLUCOSE: 103 MG/DL
GLUCOSE SERPL-MCNC: 85 MG/DL
HBA1C MFR BLD HPLC: 5.2 %
HCT VFR BLD CALC: 48.3 %
HDLC SERPL-MCNC: 44 MG/DL
HGB BLD-MCNC: 15.7 G/DL
IMM GRANULOCYTES NFR BLD AUTO: 0.3 %
LDLC SERPL-MCNC: 60 MG/DL
LYMPHOCYTES # BLD AUTO: 1.87 K/UL
LYMPHOCYTES NFR BLD AUTO: 31.2 %
MAN DIFF?: NORMAL
MCHC RBC-ENTMCNC: 29.2 PG
MCHC RBC-ENTMCNC: 32.5 G/DL
MCV RBC AUTO: 89.9 FL
MONOCYTES # BLD AUTO: 0.38 K/UL
MONOCYTES NFR BLD AUTO: 6.3 %
NEUTROPHILS # BLD AUTO: 3.45 K/UL
NEUTROPHILS NFR BLD AUTO: 57.7 %
NONHDLC SERPL-MCNC: 78 MG/DL
PLATELET # BLD AUTO: 180 K/UL
POTASSIUM SERPL-SCNC: 4.4 MMOL/L
PROT SERPL-MCNC: 6.6 G/DL
PSA SERPL-MCNC: 10.2 NG/ML
RBC # BLD: 5.37 M/UL
RBC # FLD: 14.1 %
SODIUM SERPL-SCNC: 143 MMOL/L
TRIGL SERPL-MCNC: 92 MG/DL
TSH SERPL-ACNC: 2.9 UIU/ML
WBC # FLD AUTO: 5.99 K/UL

## 2025-08-05 ENCOUNTER — APPOINTMENT (OUTPATIENT)
Dept: HEART AND VASCULAR | Facility: CLINIC | Age: 75
End: 2025-08-05
Payer: MEDICARE

## 2025-08-05 VITALS
HEART RATE: 55 BPM | DIASTOLIC BLOOD PRESSURE: 82 MMHG | OXYGEN SATURATION: 96 % | BODY MASS INDEX: 27.2 KG/M2 | WEIGHT: 190 LBS | SYSTOLIC BLOOD PRESSURE: 135 MMHG | HEIGHT: 70 IN

## 2025-08-05 DIAGNOSIS — E78.5 HYPERLIPIDEMIA, UNSPECIFIED: ICD-10-CM

## 2025-08-05 DIAGNOSIS — I10 ESSENTIAL (PRIMARY) HYPERTENSION: ICD-10-CM

## 2025-08-05 PROCEDURE — G2211 COMPLEX E/M VISIT ADD ON: CPT

## 2025-08-05 PROCEDURE — 99214 OFFICE O/P EST MOD 30 MIN: CPT

## 2025-08-05 PROCEDURE — 93000 ELECTROCARDIOGRAM COMPLETE: CPT

## 2025-08-28 ENCOUNTER — RX RENEWAL (OUTPATIENT)
Age: 75
End: 2025-08-28